# Patient Record
Sex: MALE | Race: WHITE | NOT HISPANIC OR LATINO | Employment: FULL TIME | URBAN - METROPOLITAN AREA
[De-identification: names, ages, dates, MRNs, and addresses within clinical notes are randomized per-mention and may not be internally consistent; named-entity substitution may affect disease eponyms.]

---

## 2020-12-05 ENCOUNTER — TRANSCRIBE ORDERS (OUTPATIENT)
Dept: ADMINISTRATIVE | Facility: HOSPITAL | Age: 45
End: 2020-12-05

## 2020-12-05 ENCOUNTER — LAB (OUTPATIENT)
Dept: LAB | Facility: HOSPITAL | Age: 45
End: 2020-12-05

## 2020-12-05 DIAGNOSIS — R05.9 COUGH: ICD-10-CM

## 2020-12-05 DIAGNOSIS — R05.9 COUGH: Primary | ICD-10-CM

## 2020-12-05 PROCEDURE — U0004 COV-19 TEST NON-CDC HGH THRU: HCPCS

## 2020-12-05 PROCEDURE — C9803 HOPD COVID-19 SPEC COLLECT: HCPCS

## 2020-12-06 LAB — SARS-COV-2 RNA RESP QL NAA+PROBE: NOT DETECTED

## 2021-03-10 ENCOUNTER — HOSPITAL ENCOUNTER (EMERGENCY)
Facility: HOSPITAL | Age: 46
Discharge: HOME | End: 2021-03-10
Attending: EMERGENCY MEDICINE | Admitting: EMERGENCY MEDICINE

## 2021-03-10 VITALS
WEIGHT: 165 LBS | DIASTOLIC BLOOD PRESSURE: 81 MMHG | TEMPERATURE: 97.1 F | RESPIRATION RATE: 18 BRPM | HEIGHT: 69 IN | SYSTOLIC BLOOD PRESSURE: 111 MMHG | BODY MASS INDEX: 24.44 KG/M2 | OXYGEN SATURATION: 100 % | HEART RATE: 108 BPM

## 2021-03-10 DIAGNOSIS — T50.902A INTENTIONAL DRUG OVERDOSE, INITIAL ENCOUNTER (HCC): Primary | ICD-10-CM

## 2021-03-10 DIAGNOSIS — F10.929 ALCOHOLIC INTOXICATION WITH COMPLICATION (HCC): ICD-10-CM

## 2021-03-10 LAB
AMPHET+METHAMPHET UR QL: NEGATIVE
ANION GAP SERPL CALCULATED.3IONS-SCNC: 11 MMOL/L (ref 5–15)
APAP SERPL-MCNC: <5 MCG/ML (ref 0–30)
BARBITURATES UR QL SCN: NEGATIVE
BASOPHILS # BLD AUTO: 0.1 10*3/MM3 (ref 0–0.2)
BASOPHILS NFR BLD AUTO: 1.4 % (ref 0–1.5)
BENZODIAZ UR QL SCN: NEGATIVE
BUN SERPL-MCNC: 7 MG/DL (ref 6–20)
BUN/CREAT SERPL: 9.5 (ref 7–25)
CALCIUM SPEC-SCNC: 9 MG/DL (ref 8.6–10.5)
CANNABINOIDS SERPL QL: NEGATIVE
CHLORIDE SERPL-SCNC: 107 MMOL/L (ref 98–107)
CO2 SERPL-SCNC: 24 MMOL/L (ref 22–29)
COCAINE UR QL: NEGATIVE
CREAT SERPL-MCNC: 0.74 MG/DL (ref 0.76–1.27)
DEPRECATED RDW RBC AUTO: 43.3 FL (ref 37–54)
EOSINOPHIL # BLD AUTO: 0.4 10*3/MM3 (ref 0–0.4)
EOSINOPHIL NFR BLD AUTO: 4.7 % (ref 0.3–6.2)
ERYTHROCYTE [DISTWIDTH] IN BLOOD BY AUTOMATED COUNT: 13.3 % (ref 12.3–15.4)
ETHANOL UR QL: 0.28 %
GFR SERPL CREATININE-BSD FRML MDRD: 113 ML/MIN/1.73
GLUCOSE SERPL-MCNC: 87 MG/DL (ref 65–99)
HCT VFR BLD AUTO: 48.7 % (ref 37.5–51)
HGB BLD-MCNC: 17.3 G/DL (ref 13–17.7)
HOLD SPECIMEN: NORMAL
LYMPHOCYTES # BLD AUTO: 4.2 10*3/MM3 (ref 0.7–3.1)
LYMPHOCYTES NFR BLD AUTO: 47.5 % (ref 19.6–45.3)
MCH RBC QN AUTO: 33.3 PG (ref 26.6–33)
MCHC RBC AUTO-ENTMCNC: 35.5 G/DL (ref 31.5–35.7)
MCV RBC AUTO: 93.9 FL (ref 79–97)
METHADONE UR QL SCN: NEGATIVE
MONOCYTES # BLD AUTO: 0.6 10*3/MM3 (ref 0.1–0.9)
MONOCYTES NFR BLD AUTO: 6.6 % (ref 5–12)
NEUTROPHILS NFR BLD AUTO: 3.5 10*3/MM3 (ref 1.7–7)
NEUTROPHILS NFR BLD AUTO: 39.8 % (ref 42.7–76)
NRBC BLD AUTO-RTO: 0.3 /100 WBC (ref 0–0.2)
OPIATES UR QL: NEGATIVE
OXYCODONE UR QL SCN: NEGATIVE
PLATELET # BLD AUTO: 277 10*3/MM3 (ref 140–450)
PMV BLD AUTO: 9 FL (ref 6–12)
POTASSIUM SERPL-SCNC: 4 MMOL/L (ref 3.5–5.2)
RBC # BLD AUTO: 5.19 10*6/MM3 (ref 4.14–5.8)
SALICYLATES SERPL-MCNC: <0.3 MG/DL
SODIUM SERPL-SCNC: 142 MMOL/L (ref 136–145)
WBC # BLD AUTO: 8.8 10*3/MM3 (ref 3.4–10.8)
WHOLE BLOOD HOLD SPECIMEN: NORMAL

## 2021-03-10 PROCEDURE — 80307 DRUG TEST PRSMV CHEM ANLYZR: CPT | Performed by: EMERGENCY MEDICINE

## 2021-03-10 PROCEDURE — 93005 ELECTROCARDIOGRAM TRACING: CPT | Performed by: EMERGENCY MEDICINE

## 2021-03-10 PROCEDURE — 85025 COMPLETE CBC W/AUTO DIFF WBC: CPT | Performed by: EMERGENCY MEDICINE

## 2021-03-10 PROCEDURE — 82077 ASSAY SPEC XCP UR&BREATH IA: CPT | Performed by: EMERGENCY MEDICINE

## 2021-03-10 PROCEDURE — 80143 DRUG ASSAY ACETAMINOPHEN: CPT | Performed by: EMERGENCY MEDICINE

## 2021-03-10 PROCEDURE — 80048 BASIC METABOLIC PNL TOTAL CA: CPT | Performed by: EMERGENCY MEDICINE

## 2021-03-10 PROCEDURE — 80179 DRUG ASSAY SALICYLATE: CPT | Performed by: EMERGENCY MEDICINE

## 2021-03-10 PROCEDURE — 99284 EMERGENCY DEPT VISIT MOD MDM: CPT

## 2021-03-10 RX ORDER — SODIUM CHLORIDE 0.9 % (FLUSH) 0.9 %
10 SYRINGE (ML) INJECTION AS NEEDED
Status: DISCONTINUED | OUTPATIENT
Start: 2021-03-10 | End: 2021-03-10 | Stop reason: HOSPADM

## 2021-03-10 NOTE — ED NOTES
Pt can be heard yelling at his wife over the phone that he feels like we are not paying attention to him, we have stuck him in a corner and locked him away so he wants to leave. Security called and one on one observation is initiated. MD mendez.      Diana Burk, RN  03/10/21 0559

## 2021-03-10 NOTE — DISCHARGE INSTRUCTIONS
Follow-up with Yamel as directed.    Follow-up with your primary care provider in 3-5 days.  If you do not have a primary care provider call [phone] for help in finding one, or you may follow up with [facility] at [phone].    Return to ED for any new or worsening symptoms

## 2021-03-10 NOTE — ED NOTES
Spoke to Yamel they are recommending IOP, they are sending a fax over for his recommendation and patient will be discharged.     Megan Barahona RN  03/10/21 6302

## 2021-03-10 NOTE — ED NOTES
Pt has moved all monitoring devices and refuses to have put back on.     Jessie De Los Santos, RN  03/10/21 0759

## 2021-03-10 NOTE — ED NOTES
Spoke to monik, evaluator stated she is waiting on their provider to call back and decide if they will admit him.     Megan Barahona RN  03/10/21 7457

## 2021-03-10 NOTE — ED PROVIDER NOTES
Subjective   History of Present Illness    Context: 45-year-old male presents after taking an overdose of Azilect tonight.  This was approximately 2 hours prior to arrival.  He has had no vomiting.  He states that he did something stupid.  He states he was not really trying to kill himself now.  He had been drinking and apparently had been in an argument.  He reports he has never done this previously.  Location: Ingestion  Quality:  Duration: 2 hours prior to arrival  Timing: Once  Severity:   Modifying factors: Positive alcohol use  Associated signs and symptoms:    Review of Systems   Constitutional: Negative for fever.   HENT: Negative for congestion and sore throat.    Eyes: Negative for pain and visual disturbance.   Respiratory: Negative for cough and shortness of breath.    Cardiovascular: Negative for chest pain and leg swelling.   Gastrointestinal: Negative for abdominal pain, diarrhea and vomiting.   Genitourinary: Negative for dysuria.   Musculoskeletal: Negative for back pain.   Skin: Negative for rash.   Neurological: Negative for dizziness, weakness and headaches.        Parkinson's disease   Psychiatric/Behavioral: Positive for dysphoric mood. Negative for confusion.       No past medical history on file.  Parkinson disease  No Known Allergies    No past surgical history on file.    No family history on file.  Social history positive tobacco and alcohol use  Social History     Socioeconomic History   • Marital status:      Spouse name: Not on file   • Number of children: Not on file   • Years of education: Not on file   • Highest education level: Not on file     Reports his only medication is Azilect      Objective   Physical Exam  45-year-old male awake alert.  Generally well-developed well-nourished.  Pupils equal round to light.  Neck supple chest clear cardiovascular regular rate and rhythm.  Abdomen soft nontender.  Extremities no tenderness edema.  Neurologic exam he is noted to have  slurred speech consistent with alcohol ingestion.  No other focal findings are noted this time.  Psychiatric evaluation he is not forthcoming with events although later did say he does not want to kill himself  Procedures           ED Course  ED Course as of Mar 10 0643   Wed Mar 10, 2021   0635 Patient care transferred to me from Dr. Le pending [facility] consultation and disposition    [AA]   0642 Sleeping vital signs stable    [SP]      ED Course User Index  [AA] Shruthi Barrios PA  [SP] Dino Le MD                                           [facility]  Chart review: Only outpatient visits  Comorbidity: As per past history  Differential: Overdose, alcohol desiccation, suicidal ideation  My EKG interpretation: Sinus rhythm without acute abnormality  Lab: Acetaminophen and salicylate both negative ethanol 0.278, CBC and basic metabolic panel without significant abnormality urine DOA negative  Radiology:   Discussion/treatment: Poison control was contacted.  They recommend 6-hour observation.  Major risk would be serotonin syndrome.  Patient has remained asymptomatic.  He will have [facility] evaluation.  He will be turned over to THOMAS Barron for final evaluation and disposition  Patient was evaluated using appropriate PPE      Final diagnoses:   Intentional drug overdose, initial encounter (CMS/[facility])   Alcoholic intoxication with complication (CMS/[facility])            Dino Le MD  03/10/21 0630

## 2021-03-10 NOTE — ED NOTES
Spoke with joe Pritchett at Poison control center. States there is a 6 hour monitoring period. Recommends EKG, CMP, CBC, salicylate level, tylenol level and ETOH. States there is a high risk for developing serotonin syndrome. If the pt develops any of those symptoms recommends ativan or versed for management. Diana Alegria, RN  03/10/21 0431

## 2021-03-10 NOTE — ED NOTES
Santos from poison control called for update. He states that he will call back for [facility] drug screen results.     Jessica Perez LPN  03/10/21 0617

## 2021-03-10 NOTE — ED NOTES
Pt reports he took 10 tabs of his parkinson's medication tonight. He is prescribed this but is only suppose to take 1 tab per day. He is unsure of the dose. States he has been having a lot of issues at work recently but is unsure why he took these medications tonight. He told EMS that he was attempting suicide but denies this to me upon assessment. Pt also admits to ETOH use tonight. Pt is placed in suicide precautions at this time. He is on the full cardiac monitor per poison control but is in a glass door room directly into the nurses station.      Diana Burk, RN  03/10/21 0512       Diana Burk, DEEPAK  03/10/21 0514

## 2021-03-10 NOTE — ED NOTES
0755- call placed to [facility], spoke to Kaitlynn. She stated that she was going to call and evaluate the pt in about 5 minutes. She just received the BAT that was faxed.      Sho Crespo  03/10/21 0758

## 2021-03-11 LAB — QT INTERVAL: 353 MS

## 2023-04-24 ENCOUNTER — APPOINTMENT (OUTPATIENT)
Dept: GENERAL RADIOLOGY | Facility: HOSPITAL | Age: 48
End: 2023-04-24
Payer: COMMERCIAL

## 2023-04-24 ENCOUNTER — HOSPITAL ENCOUNTER (EMERGENCY)
Facility: HOSPITAL | Age: 48
Discharge: HOME | End: 2023-04-24
Attending: EMERGENCY MEDICINE | Admitting: EMERGENCY MEDICINE
Payer: COMMERCIAL

## 2023-04-24 VITALS
TEMPERATURE: 98.6 F | OXYGEN SATURATION: 99 % | SYSTOLIC BLOOD PRESSURE: 140 MMHG | DIASTOLIC BLOOD PRESSURE: 82 MMHG | RESPIRATION RATE: 19 BRPM | BODY MASS INDEX: 24.44 KG/M2 | HEART RATE: 90 BPM | HEIGHT: 69 IN | WEIGHT: 165 LBS

## 2023-04-24 DIAGNOSIS — L03.115 CELLULITIS OF RIGHT LOWER EXTREMITY: ICD-10-CM

## 2023-04-24 DIAGNOSIS — A28.1 CAT SCRATCH FEVER: Primary | ICD-10-CM

## 2023-04-24 LAB
ALBUMIN SERPL-MCNC: 4.1 G/DL (ref 3.5–5.2)
ALBUMIN/GLOB SERPL: 1.2 G/DL
ALP SERPL-CCNC: 71 U/L (ref 39–117)
ALT SERPL W P-5'-P-CCNC: 5 U/L (ref 1–41)
ANION GAP SERPL CALCULATED.3IONS-SCNC: 12 MMOL/L (ref 5–15)
AST SERPL-CCNC: 18 U/L (ref 1–40)
BASOPHILS # BLD AUTO: 0.1 10*3/MM3 (ref 0–0.2)
BASOPHILS NFR BLD AUTO: 0.7 % (ref 0–1.5)
BILIRUB SERPL-MCNC: 0.7 MG/DL (ref 0–1.2)
BUN SERPL-MCNC: 8 MG/DL (ref 6–20)
BUN/CREAT SERPL: 16.7 (ref 7–25)
CALCIUM SPEC-SCNC: 9.3 MG/DL (ref 8.6–10.5)
CHLORIDE SERPL-SCNC: 101 MMOL/L (ref 98–107)
CO2 SERPL-SCNC: 24 MMOL/L (ref 22–29)
CREAT SERPL-MCNC: 0.48 MG/DL (ref 0.76–1.27)
D-LACTATE SERPL-SCNC: 0.8 MMOL/L (ref 0.5–2)
DEPRECATED RDW RBC AUTO: 45.9 FL (ref 37–54)
EGFRCR SERPLBLD CKD-EPI 2021: 126.6 ML/MIN/1.73
EOSINOPHIL # BLD AUTO: 0.1 10*3/MM3 (ref 0–0.4)
EOSINOPHIL NFR BLD AUTO: 1.6 % (ref 0.3–6.2)
ERYTHROCYTE [DISTWIDTH] IN BLOOD BY AUTOMATED COUNT: 13.4 % (ref 12.3–15.4)
GLOBULIN UR ELPH-MCNC: 3.3 GM/DL
GLUCOSE SERPL-MCNC: 121 MG/DL (ref 65–99)
HCT VFR BLD AUTO: 43.5 % (ref 37.5–51)
HGB BLD-MCNC: 14.8 G/DL (ref 13–17.7)
LYMPHOCYTES # BLD AUTO: 2 10*3/MM3 (ref 0.7–3.1)
LYMPHOCYTES NFR BLD AUTO: 23.3 % (ref 19.6–45.3)
MCH RBC QN AUTO: 32 PG (ref 26.6–33)
MCHC RBC AUTO-ENTMCNC: 34.1 G/DL (ref 31.5–35.7)
MCV RBC AUTO: 93.9 FL (ref 79–97)
MONOCYTES # BLD AUTO: 0.6 10*3/MM3 (ref 0.1–0.9)
MONOCYTES NFR BLD AUTO: 7.4 % (ref 5–12)
NEUTROPHILS NFR BLD AUTO: 5.8 10*3/MM3 (ref 1.7–7)
NEUTROPHILS NFR BLD AUTO: 67 % (ref 42.7–76)
NRBC BLD AUTO-RTO: 0.1 /100 WBC (ref 0–0.2)
PLATELET # BLD AUTO: 264 10*3/MM3 (ref 140–450)
PMV BLD AUTO: 8.6 FL (ref 6–12)
POTASSIUM SERPL-SCNC: 3.8 MMOL/L (ref 3.5–5.2)
PROT SERPL-MCNC: 7.4 G/DL (ref 6–8.5)
RBC # BLD AUTO: 4.63 10*6/MM3 (ref 4.14–5.8)
SODIUM SERPL-SCNC: 137 MMOL/L (ref 136–145)
WBC NRBC COR # BLD: 8.7 10*3/MM3 (ref 3.4–10.8)

## 2023-04-24 PROCEDURE — 36415 COLL VENOUS BLD VENIPUNCTURE: CPT

## 2023-04-24 PROCEDURE — 80053 COMPREHEN METABOLIC PANEL: CPT | Performed by: NURSE PRACTITIONER

## 2023-04-24 PROCEDURE — 87070 CULTURE OTHR SPECIMN AEROBIC: CPT | Performed by: NURSE PRACTITIONER

## 2023-04-24 PROCEDURE — 99283 EMERGENCY DEPT VISIT LOW MDM: CPT

## 2023-04-24 PROCEDURE — 25010000002 AZITHROMYCIN PER 500 MG: Performed by: NURSE PRACTITIONER

## 2023-04-24 PROCEDURE — 87040 BLOOD CULTURE FOR BACTERIA: CPT | Performed by: NURSE PRACTITIONER

## 2023-04-24 PROCEDURE — 96365 THER/PROPH/DIAG IV INF INIT: CPT

## 2023-04-24 PROCEDURE — 73610 X-RAY EXAM OF ANKLE: CPT

## 2023-04-24 PROCEDURE — 85025 COMPLETE CBC W/AUTO DIFF WBC: CPT | Performed by: NURSE PRACTITIONER

## 2023-04-24 PROCEDURE — 83605 ASSAY OF LACTIC ACID: CPT | Performed by: NURSE PRACTITIONER

## 2023-04-24 PROCEDURE — 87205 SMEAR GRAM STAIN: CPT | Performed by: NURSE PRACTITIONER

## 2023-04-24 RX ORDER — AZITHROMYCIN 250 MG/1
TABLET, FILM COATED ORAL
Qty: 4 TABLET | Refills: 0 | Status: SHIPPED | OUTPATIENT
Start: 2023-04-24

## 2023-04-24 RX ORDER — SODIUM CHLORIDE 0.9 % (FLUSH) 0.9 %
10 SYRINGE (ML) INJECTION AS NEEDED
Status: DISCONTINUED | OUTPATIENT
Start: 2023-04-24 | End: 2023-04-24 | Stop reason: HOSPADM

## 2023-04-24 RX ADMIN — AZITHROMYCIN 500 MG: 500 INJECTION, POWDER, LYOPHILIZED, FOR SOLUTION INTRAVENOUS at 17:02

## 2023-04-24 NOTE — ED PROVIDER NOTES
Subjective   History of Present Illness  47-year-old male presents with complaint of cat bite on Friday.  He reports he went to the urgent care and was started on Augmentin.  He reports that began becoming erythematous with drainage yesterday and pain and swelling began in his right lateral malleolus.  He denies known fever.  He does report not feeling well.    Primary care: Sydnee Rodriges NP        Review of Systems    Past Medical History:   Diagnosis Date   • Parkinsons        No Known Allergies    No past surgical history on file.    No family history on file.    Social History     Socioeconomic History   • Marital status:    Tobacco Use   • Smoking status: Every Day     Types: Cigarettes     Passive exposure: Current   • Smokeless tobacco: Never   Vaping Use   • Vaping Use: Never used   Substance and Sexual Activity   • Alcohol use: Yes     Comment: OCC           Objective   Physical Exam  Vitals and nursing note reviewed.   Constitutional:       General: He is not in acute distress.     Appearance: He is well-developed.   HENT:      Head: Normocephalic and atraumatic.   Cardiovascular:      Rate and Rhythm: Normal rate and regular rhythm.      Heart sounds: Normal heart sounds. No murmur heard.    No friction rub. No gallop.   Pulmonary:      Effort: Pulmonary effort is normal. No respiratory distress.      Breath sounds: Normal breath sounds. No wheezing.   Musculoskeletal:      Cervical back: Normal range of motion and neck supple.   Lymphadenopathy:      Cervical: No cervical adenopathy.   Skin:     General: Skin is warm and dry.      Capillary Refill: Capillary refill takes less than 2 seconds.      Findings: No rash.   Neurological:      Mental Status: He is alert and oriented to person, place, and time.   Psychiatric:         Behavior: Behavior normal.         Thought Content: Thought content normal.         Judgment: Judgment normal.         Procedures           ED Course                           "                 Medical Decision Making  Amount and/or Complexity of Data Reviewed  Labs: ordered. Decision-making details documented in ED Course.  Radiology: ordered. Decision-making details documented in ED Course.      Risk  Prescription drug management.        Interpreted by radiologist as below:     XR Ankle 3+ View Right    Result Date: 4/24/2023  Impression: Diffuse soft tissue swelling without acute osseous abnormality noted Electronically Signed: Sesar Kim  4/24/2023 4:45 PM EDT  Workstation ID: [id number]        /84 (BP Location: Left arm, Patient Position: Sitting)   Pulse 94   Temp 98.5 °F (36.9 °C) (Oral)   Resp 20   Ht 175.3 cm (69\")   Wt 74.8 kg (165 lb)   SpO2 99%   BMI 24.37 kg/m²      Lab Results (last 24 hours)     Procedure Component Value Units Date/Time    CBC & Differential [[id number]]  (Normal) Collected: 04/24/23 1618    Specimen: Blood from Arm, Left Updated: 04/24/23 1626    Narrative:      The following orders were created for panel order CBC & Differential.  Procedure                               Abnormality         Status                     ---------                               -----------         ------                     CBC Auto Differential[[id number]]        Normal              Final result                 Please view results for these tests on the individual orders.    Comprehensive Metabolic Panel [[id number]]  (Abnormal) Collected: 04/24/23 1618    Specimen: Blood from Arm, Left Updated: 04/24/23 1649     Glucose 121 mg/dL      BUN 8 mg/dL      Creatinine 0.48 mg/dL      Sodium 137 mmol/L      Potassium 3.8 mmol/L      Chloride 101 mmol/L      CO2 24.0 mmol/L      Calcium 9.3 mg/dL      Total Protein 7.4 g/dL      Albumin 4.1 g/dL      ALT (SGPT) 5 U/L      AST (SGOT) 18 U/L      Alkaline Phosphatase 71 U/L      Total Bilirubin 0.7 mg/dL      Globulin 3.3 gm/dL      A/G Ratio 1.2 g/dL      BUN/Creatinine Ratio 16.7     Anion Gap 12.0 mmol/L      eGFR 126.6 " mL/min/1.73     Narrative:      GFR Normal >60  Chronic Kidney Disease <60  Kidney Failure <15      Lactic Acid, Plasma [[id number]]  (Normal) Collected: 04/24/23 1618    Specimen: Blood from Arm, Left Updated: 04/24/23 1650     Lactate 0.8 mmol/L     Blood Culture - Blood, Arm, Left [[id number]] Collected: 04/24/23 1618    Specimen: Blood from Arm, Left Updated: 04/24/23 1623    CBC Auto Differential [[id number]]  (Normal) Collected: 04/24/23 1618    Specimen: Blood from Arm, Left Updated: 04/24/23 1626     WBC 8.70 10*3/mm3      RBC 4.63 10*6/mm3      Hemoglobin 14.8 g/dL      Hematocrit 43.5 %      MCV 93.9 fL      MCH 32.0 pg      MCHC 34.1 g/dL      RDW 13.4 %      RDW-SD 45.9 fl      MPV 8.6 fL      Platelets 264 10*3/mm3      Neutrophil % 67.0 %      Lymphocyte % 23.3 %      Monocyte % 7.4 %      Eosinophil % 1.6 %      Basophil % 0.7 %      Neutrophils, Absolute 5.80 10*3/mm3      Lymphocytes, Absolute 2.00 10*3/mm3      Monocytes, Absolute 0.60 10*3/mm3      Eosinophils, Absolute 0.10 10*3/mm3      Basophils, Absolute 0.10 10*3/mm3      nRBC 0.1 /100 WBC     Wound Culture - Swab, Leg, Right [[id number]] Collected: 04/24/23 1619    Specimen: Swab from Leg, Right Updated: 04/24/23 1623    Blood Culture - Blood, Arm, Left [[id number]] Collected: 04/24/23 1632    Specimen: Blood from Arm, Left Updated: 04/24/23 1648           Medications   sodium chloride 0.9 % flush 10 mL (has no administration in time range)   azithromycin (ZITHROMAX) 500 mg in sodium chloride 0.9 % 250 mL IVPB-VTB (500 mg Intravenous New Bag 4/24/23 1702)        Patient undressed and placed in gown for exam.  Appropriate PPE worn during patient exam.  Appropriate monitoring initiated. Patient is alert and oriented x3.  No acute distress noted.  Wound was painted with Betadine.  IV established and labs obtained.  X-ray of the right ankle obtained with the above findings.  CBC CMP essentially unremarkable.  Lactic 0.8 blood cultures pending.  Wound  culture pending.  X-ray was significant for soft tissue swelling, otherwise no acute osseous abnormalities patient was given azithromycin 500 mg IV.  He was discharged home on azithromycin 250 once daily for 4 days.  He is encouraged to cleanse twice daily with antibacterial soap and water and apply bacitracin.    I reviewed chart 4/21/2023 patient was seen at urgent care for cat bite of the right ankle.. My radiology interpretation soft tissue swelling of the right ankle without gas.  No obvious tendinopathy.  Differential Diagnoses, not all-inclusive and does not constitute entirety of all causes: Cat scratch fever, cellulitis.  Patient was found to have both on exam.    Disposition/Treatment: Discussed results with patient, verbalized understanding. Discussed reasons to return to the ER, patient verbalized understanding. Agreeable with plan of care. Patient was stable upon discharge.    Upon reassessment, patient is flesh tone warm and dry no acute distress noted.  Vital signs are stable.    Part of this note may be an electronic transcription/translation of spoken language to printed text using the Dragon Dictation System.         Final diagnoses:   Cat scratch fever   Cellulitis of right lower extremity       ED Disposition  ED Disposition     ED Disposition   Discharge    Condition   Stable    Comment   --             Sydnee Rodriges, APRN  [address] [facility] [address] [facility] [place] [address] [phone]    Schedule an appointment as soon as possible for a visit       [facility] EMERGENCY DEPARTMENT  [address] [address] [phone]  Go to   If symptoms worsen         Medication List      New Prescriptions    azithromycin 250 MG tablet  Commonly known as: Zithromax Z-Jean  Take1 tablet daily for 4 days.           Where to Get Your Medications      These medications were sent to [id number] - [facility] [place] - [address] - [phone]  - [phone]   [address], [place] [address]    Phone: [phone]   · azithromycin 250 MG tablet          Madison Matthews, APRN  04/24/23 9330

## 2023-04-24 NOTE — DISCHARGE INSTRUCTIONS
Antibiotics as prescribed.  Make sure you are cleansing the area twice daily with antibacterial soap and water then apply bacitracin bandage.  Schedule follow-up for wound check in the next 2 days.  Return to the ER for new or worsening symptoms.

## 2023-04-27 LAB
BACTERIA SPEC AEROBE CULT: ABNORMAL
GRAM STN SPEC: ABNORMAL

## 2023-04-27 NOTE — PROGRESS NOTES
Patient was seen in ED on 4/24 for cat bite. Patient was previously given amoxicillin with no resolution in symptoms. Patient wound culture resulted with Pasteurella multocida. Patient was given Rx for azithromycin. Therapy is appropriate coverage. No further follow-up required..    Microbiology Results (last 10 days)       Procedure Component Value - Date/Time    Blood Culture - Blood, Arm, Left [[id number]]  (Normal) Collected: 04/24/23 1632    Lab Status: Preliminary result Specimen: Blood from Arm, Left Updated: 04/26/23 1700     Blood Culture No growth at 2 days    Wound Culture - Swab, Leg, Right [[id number]]  (Abnormal) Collected: 04/24/23 1619    Lab Status: Final result Specimen: Swab from Leg, Right Updated: 04/27/23 0828     Wound Culture Scant growth (1+) Pasteurella multocida     Gram Stain No organisms seen    Narrative:      Pasteurella species are susceptible to penicillin, cephalosporins (except oral agents), tetracycline, quinolones, trimethoprim-sulfamethoxazole.      Blood Culture - Blood, Arm, Left [[id number]]  (Normal) Collected: 04/24/23 1618    Lab Status: Preliminary result Specimen: Blood from Arm, Left Updated: 04/26/23 1631     Blood Culture No growth at 2 days            Vera Ambriz, PharmLAURIE  4/27/2023 10:22 EDT

## 2023-04-29 LAB
BACTERIA SPEC AEROBE CULT: NORMAL
BACTERIA SPEC AEROBE CULT: NORMAL

## 2024-06-21 ENCOUNTER — OFFICE (AMBULATORY)
Dept: URBAN - METROPOLITAN AREA PATHOLOGY 19 | Facility: PATHOLOGY | Age: 49
End: 2024-06-21
Payer: COMMERCIAL

## 2024-06-21 ENCOUNTER — ON CAMPUS - OUTPATIENT (AMBULATORY)
Dept: URBAN - METROPOLITAN AREA HOSPITAL 2 | Facility: HOSPITAL | Age: 49
End: 2024-06-21
Payer: COMMERCIAL

## 2024-06-21 VITALS
HEART RATE: 76 BPM | SYSTOLIC BLOOD PRESSURE: 99 MMHG | HEART RATE: 82 BPM | SYSTOLIC BLOOD PRESSURE: 98 MMHG | OXYGEN SATURATION: 98 % | DIASTOLIC BLOOD PRESSURE: 99 MMHG | HEIGHT: 69 IN | RESPIRATION RATE: 20 BRPM | SYSTOLIC BLOOD PRESSURE: 136 MMHG | DIASTOLIC BLOOD PRESSURE: 74 MMHG | DIASTOLIC BLOOD PRESSURE: 73 MMHG | WEIGHT: 160.2 LBS | OXYGEN SATURATION: 100 % | HEART RATE: 74 BPM | DIASTOLIC BLOOD PRESSURE: 69 MMHG | RESPIRATION RATE: 13 BRPM | DIASTOLIC BLOOD PRESSURE: 82 MMHG | SYSTOLIC BLOOD PRESSURE: 112 MMHG | HEART RATE: 79 BPM | SYSTOLIC BLOOD PRESSURE: 107 MMHG | OXYGEN SATURATION: 97 % | DIASTOLIC BLOOD PRESSURE: 70 MMHG | HEART RATE: 81 BPM | HEART RATE: 72 BPM | OXYGEN SATURATION: 99 % | SYSTOLIC BLOOD PRESSURE: 115 MMHG | SYSTOLIC BLOOD PRESSURE: 116 MMHG | DIASTOLIC BLOOD PRESSURE: 80 MMHG | HEART RATE: 80 BPM | DIASTOLIC BLOOD PRESSURE: 95 MMHG | RESPIRATION RATE: 18 BRPM | RESPIRATION RATE: 15 BRPM | RESPIRATION RATE: 16 BRPM | SYSTOLIC BLOOD PRESSURE: 119 MMHG | TEMPERATURE: 97.3 F | RESPIRATION RATE: 14 BRPM

## 2024-06-21 DIAGNOSIS — K57.30 DIVERTICULOSIS OF LARGE INTESTINE WITHOUT PERFORATION OR ABS: ICD-10-CM

## 2024-06-21 DIAGNOSIS — D12.3 BENIGN NEOPLASM OF TRANSVERSE COLON: ICD-10-CM

## 2024-06-21 DIAGNOSIS — Z12.11 ENCOUNTER FOR SCREENING FOR MALIGNANT NEOPLASM OF COLON: ICD-10-CM

## 2024-06-21 DIAGNOSIS — D12.8 BENIGN NEOPLASM OF RECTUM: ICD-10-CM

## 2024-06-21 DIAGNOSIS — D12.5 BENIGN NEOPLASM OF SIGMOID COLON: ICD-10-CM

## 2024-06-21 DIAGNOSIS — K64.1 SECOND DEGREE HEMORRHOIDS: ICD-10-CM

## 2024-06-21 PROBLEM — K63.5 POLYP OF COLON: Status: ACTIVE | Noted: 2024-06-21

## 2024-06-21 PROBLEM — K62.1 RECTAL POLYP: Status: ACTIVE | Noted: 2024-06-21

## 2024-06-21 LAB
GI HISTOLOGY: A. TRANSVERSE COLON: (no result)
GI HISTOLOGY: B. SIGMOID COLON: (no result)
GI HISTOLOGY: C. RECTUM: (no result)
GI HISTOLOGY: PDF REPORT: (no result)

## 2024-06-21 PROCEDURE — 45385 COLONOSCOPY W/LESION REMOVAL: CPT | Mod: 33 | Performed by: INTERNAL MEDICINE

## 2024-06-21 PROCEDURE — 88305 TISSUE EXAM BY PATHOLOGIST: CPT | Performed by: PATHOLOGY

## 2024-07-11 ENCOUNTER — LAB (OUTPATIENT)
Dept: LAB | Facility: HOSPITAL | Age: 49
End: 2024-07-11
Payer: COMMERCIAL

## 2024-07-11 ENCOUNTER — HOSPITAL ENCOUNTER (OUTPATIENT)
Dept: CARDIOLOGY | Facility: HOSPITAL | Age: 49
Discharge: HOME | End: 2024-07-11
Payer: COMMERCIAL

## 2024-07-11 ENCOUNTER — TRANSCRIBE ORDERS (OUTPATIENT)
Dept: ADMINISTRATIVE | Facility: HOSPITAL | Age: 49
End: 2024-07-11
Payer: COMMERCIAL

## 2024-07-11 ENCOUNTER — HOSPITAL ENCOUNTER (OUTPATIENT)
Dept: GENERAL RADIOLOGY | Facility: HOSPITAL | Age: 49
Discharge: HOME | End: 2024-07-11
Payer: COMMERCIAL

## 2024-07-11 DIAGNOSIS — Z01.818 PRE-OP TESTING: ICD-10-CM

## 2024-07-11 DIAGNOSIS — Z01.818 PRE-OP TESTING: Primary | ICD-10-CM

## 2024-07-11 LAB
ALBUMIN SERPL-MCNC: 4.1 G/DL (ref 3.5–5.2)
ALBUMIN/GLOB SERPL: 1.4 G/DL
ALP SERPL-CCNC: 67 U/L (ref 39–117)
ALT SERPL W P-5'-P-CCNC: 6 U/L (ref 1–41)
ANION GAP SERPL CALCULATED.3IONS-SCNC: 10.3 MMOL/L (ref 5–15)
AST SERPL-CCNC: 23 U/L (ref 1–40)
BASOPHILS # BLD AUTO: 0.08 10*3/MM3 (ref 0–0.2)
BASOPHILS NFR BLD AUTO: 1.2 % (ref 0–1.5)
BILIRUB SERPL-MCNC: 0.4 MG/DL (ref 0–1.2)
BUN SERPL-MCNC: 8 MG/DL (ref 6–20)
BUN/CREAT SERPL: 9.8 (ref 7–25)
CALCIUM SPEC-SCNC: 9.3 MG/DL (ref 8.6–10.5)
CHLORIDE SERPL-SCNC: 106 MMOL/L (ref 98–107)
CO2 SERPL-SCNC: 23.7 MMOL/L (ref 22–29)
CREAT SERPL-MCNC: 0.82 MG/DL (ref 0.76–1.27)
DEPRECATED RDW RBC AUTO: 41.2 FL (ref 37–54)
EGFRCR SERPLBLD CKD-EPI 2021: 106.4 ML/MIN/1.73
EOSINOPHIL # BLD AUTO: 0.19 10*3/MM3 (ref 0–0.4)
EOSINOPHIL NFR BLD AUTO: 2.9 % (ref 0.3–6.2)
ERYTHROCYTE [DISTWIDTH] IN BLOOD BY AUTOMATED COUNT: 11.9 % (ref 12.3–15.4)
GLOBULIN UR ELPH-MCNC: 2.9 GM/DL
GLUCOSE SERPL-MCNC: 107 MG/DL (ref 65–99)
HCT VFR BLD AUTO: 45 % (ref 37.5–51)
HGB BLD-MCNC: 15.4 G/DL (ref 13–17.7)
IMM GRANULOCYTES # BLD AUTO: 0.01 10*3/MM3 (ref 0–0.05)
IMM GRANULOCYTES NFR BLD AUTO: 0.2 % (ref 0–0.5)
LYMPHOCYTES # BLD AUTO: 2.38 10*3/MM3 (ref 0.7–3.1)
LYMPHOCYTES NFR BLD AUTO: 36.6 % (ref 19.6–45.3)
MCH RBC QN AUTO: 32.6 PG (ref 26.6–33)
MCHC RBC AUTO-ENTMCNC: 34.2 G/DL (ref 31.5–35.7)
MCV RBC AUTO: 95.3 FL (ref 79–97)
MONOCYTES # BLD AUTO: 0.59 10*3/MM3 (ref 0.1–0.9)
MONOCYTES NFR BLD AUTO: 9.1 % (ref 5–12)
NEUTROPHILS NFR BLD AUTO: 3.25 10*3/MM3 (ref 1.7–7)
NEUTROPHILS NFR BLD AUTO: 50 % (ref 42.7–76)
NRBC BLD AUTO-RTO: 0 /100 WBC (ref 0–0.2)
PLATELET # BLD AUTO: 283 10*3/MM3 (ref 140–450)
PMV BLD AUTO: 11.1 FL (ref 6–12)
POTASSIUM SERPL-SCNC: 4.7 MMOL/L (ref 3.5–5.2)
PROT SERPL-MCNC: 7 G/DL (ref 6–8.5)
QT INTERVAL: 371 MS
QTC INTERVAL: 424 MS
RBC # BLD AUTO: 4.72 10*6/MM3 (ref 4.14–5.8)
SODIUM SERPL-SCNC: 140 MMOL/L (ref 136–145)
WBC NRBC COR # BLD AUTO: 6.5 10*3/MM3 (ref 3.4–10.8)

## 2024-07-11 PROCEDURE — 80053 COMPREHEN METABOLIC PANEL: CPT

## 2024-07-11 PROCEDURE — 36415 COLL VENOUS BLD VENIPUNCTURE: CPT

## 2024-07-11 PROCEDURE — 85025 COMPLETE CBC W/AUTO DIFF WBC: CPT

## 2024-07-11 PROCEDURE — 71046 X-RAY EXAM CHEST 2 VIEWS: CPT

## 2024-07-11 PROCEDURE — 93005 ELECTROCARDIOGRAM TRACING: CPT | Performed by: STUDENT IN AN ORGANIZED HEALTH CARE EDUCATION/TRAINING PROGRAM

## 2024-07-18 LAB
QT INTERVAL: 371 MS
QTC INTERVAL: 424 MS

## 2024-09-18 ENCOUNTER — TRANSCRIBE ORDERS (OUTPATIENT)
Dept: ADMINISTRATIVE | Facility: HOSPITAL | Age: 49
End: 2024-09-18
Payer: COMMERCIAL

## 2024-09-18 ENCOUNTER — HOSPITAL ENCOUNTER (OUTPATIENT)
Dept: CARDIOLOGY | Facility: HOSPITAL | Age: 49
Discharge: HOME | End: 2024-09-18
Admitting: NURSE PRACTITIONER
Payer: COMMERCIAL

## 2024-09-18 DIAGNOSIS — R22.41 LOCALIZED SWELLING, MASS, OR LUMP OF LOWER EXTREMITY, RIGHT: Primary | ICD-10-CM

## 2024-09-18 DIAGNOSIS — R22.41 LOCALIZED SWELLING, MASS, OR LUMP OF LOWER EXTREMITY, RIGHT: ICD-10-CM

## 2024-09-18 LAB
BH CV LOWER VASCULAR LEFT COMMON FEMORAL AUGMENT: NORMAL
BH CV LOWER VASCULAR LEFT COMMON FEMORAL COMPETENT: NORMAL
BH CV LOWER VASCULAR LEFT COMMON FEMORAL COMPRESS: NORMAL
BH CV LOWER VASCULAR LEFT COMMON FEMORAL PHASIC: NORMAL
BH CV LOWER VASCULAR LEFT COMMON FEMORAL SPONT: NORMAL
BH CV LOWER VASCULAR RIGHT COMMON FEMORAL AUGMENT: NORMAL
BH CV LOWER VASCULAR RIGHT COMMON FEMORAL COMPETENT: NORMAL
BH CV LOWER VASCULAR RIGHT COMMON FEMORAL COMPRESS: NORMAL
BH CV LOWER VASCULAR RIGHT COMMON FEMORAL PHASIC: NORMAL
BH CV LOWER VASCULAR RIGHT COMMON FEMORAL SPONT: NORMAL
BH CV LOWER VASCULAR RIGHT DISTAL FEMORAL COMPRESS: NORMAL
BH CV LOWER VASCULAR RIGHT GASTRONEMIUS COMPRESS: NORMAL
BH CV LOWER VASCULAR RIGHT GREATER SAPH AK COMPRESS: NORMAL
BH CV LOWER VASCULAR RIGHT GREATER SAPH BK COMPRESS: NORMAL
BH CV LOWER VASCULAR RIGHT LESSER SAPH COMPRESS: NORMAL
BH CV LOWER VASCULAR RIGHT MID FEMORAL AUGMENT: NORMAL
BH CV LOWER VASCULAR RIGHT MID FEMORAL COMPETENT: NORMAL
BH CV LOWER VASCULAR RIGHT MID FEMORAL COMPRESS: NORMAL
BH CV LOWER VASCULAR RIGHT MID FEMORAL PHASIC: NORMAL
BH CV LOWER VASCULAR RIGHT MID FEMORAL SPONT: NORMAL
BH CV LOWER VASCULAR RIGHT PERONEAL COMPRESS: NORMAL
BH CV LOWER VASCULAR RIGHT POPLITEAL AUGMENT: NORMAL
BH CV LOWER VASCULAR RIGHT POPLITEAL COMPETENT: NORMAL
BH CV LOWER VASCULAR RIGHT POPLITEAL COMPRESS: NORMAL
BH CV LOWER VASCULAR RIGHT POPLITEAL PHASIC: NORMAL
BH CV LOWER VASCULAR RIGHT POPLITEAL SPONT: NORMAL
BH CV LOWER VASCULAR RIGHT POSTERIOR TIBIAL COMPRESS: NORMAL
BH CV LOWER VASCULAR RIGHT PROXIMAL FEMORAL COMPRESS: NORMAL
BH CV LOWER VASCULAR RIGHT SAPHENOFEMORAL JUNCTION COMPRESS: NORMAL
BH CV VAS PRELIMINARY FINDINGS SCRIPTING: 1

## 2024-09-18 PROCEDURE — 93971 EXTREMITY STUDY: CPT

## 2025-02-27 ENCOUNTER — APPOINTMENT (OUTPATIENT)
Dept: GENERAL RADIOLOGY | Facility: HOSPITAL | Age: 50
End: 2025-02-27
Payer: COMMERCIAL

## 2025-02-27 ENCOUNTER — HOSPITAL ENCOUNTER (OUTPATIENT)
Facility: HOSPITAL | Age: 50
Setting detail: OBSERVATION
Discharge: HOME | End: 2025-02-28
Attending: EMERGENCY MEDICINE | Admitting: EMERGENCY MEDICINE
Payer: COMMERCIAL

## 2025-02-27 DIAGNOSIS — R42 DIZZINESS AND GIDDINESS: ICD-10-CM

## 2025-02-27 DIAGNOSIS — R07.89 CHEST PRESSURE: Primary | ICD-10-CM

## 2025-02-27 DIAGNOSIS — I07.9 TRICUSPID VALVE DISORDER: ICD-10-CM

## 2025-02-27 LAB
ALBUMIN SERPL-MCNC: 4.3 G/DL (ref 3.5–5.2)
ALBUMIN/GLOB SERPL: 1.4 G/DL
ALP SERPL-CCNC: 65 U/L (ref 39–117)
ALT SERPL W P-5'-P-CCNC: 8 U/L (ref 1–41)
ANION GAP SERPL CALCULATED.3IONS-SCNC: 9.5 MMOL/L (ref 5–15)
AST SERPL-CCNC: 33 U/L (ref 1–40)
BASOPHILS # BLD AUTO: 0.12 10*3/MM3 (ref 0–0.2)
BASOPHILS NFR BLD AUTO: 1.5 % (ref 0–1.5)
BILIRUB SERPL-MCNC: 0.6 MG/DL (ref 0–1.2)
BUN SERPL-MCNC: 10 MG/DL (ref 6–20)
BUN/CREAT SERPL: 12.8 (ref 7–25)
CALCIUM SPEC-SCNC: 9.3 MG/DL (ref 8.6–10.5)
CHLORIDE SERPL-SCNC: 106 MMOL/L (ref 98–107)
CO2 SERPL-SCNC: 25.5 MMOL/L (ref 22–29)
CREAT SERPL-MCNC: 0.78 MG/DL (ref 0.76–1.27)
D DIMER PPP FEU-MCNC: <0.27 MCGFEU/ML (ref 0–0.51)
DEPRECATED RDW RBC AUTO: 43.2 FL (ref 37–54)
EGFRCR SERPLBLD CKD-EPI 2021: 108 ML/MIN/1.73
EOSINOPHIL # BLD AUTO: 0.16 10*3/MM3 (ref 0–0.4)
EOSINOPHIL NFR BLD AUTO: 2 % (ref 0.3–6.2)
ERYTHROCYTE [DISTWIDTH] IN BLOOD BY AUTOMATED COUNT: 12.4 % (ref 12.3–15.4)
GEN 5 1HR TROPONIN T REFLEX: 6 NG/L
GLOBULIN UR ELPH-MCNC: 3 GM/DL
GLUCOSE SERPL-MCNC: 86 MG/DL (ref 65–99)
HCT VFR BLD AUTO: 45.6 % (ref 37.5–51)
HGB BLD-MCNC: 15.2 G/DL (ref 13–17.7)
HOLD SPECIMEN: NORMAL
HOLD SPECIMEN: NORMAL
IMM GRANULOCYTES # BLD AUTO: 0.02 10*3/MM3 (ref 0–0.05)
IMM GRANULOCYTES NFR BLD AUTO: 0.2 % (ref 0–0.5)
LYMPHOCYTES # BLD AUTO: 2.73 10*3/MM3 (ref 0.7–3.1)
LYMPHOCYTES NFR BLD AUTO: 33.8 % (ref 19.6–45.3)
MCH RBC QN AUTO: 31.7 PG (ref 26.6–33)
MCHC RBC AUTO-ENTMCNC: 33.3 G/DL (ref 31.5–35.7)
MCV RBC AUTO: 95 FL (ref 79–97)
MONOCYTES # BLD AUTO: 0.6 10*3/MM3 (ref 0.1–0.9)
MONOCYTES NFR BLD AUTO: 7.4 % (ref 5–12)
NEUTROPHILS NFR BLD AUTO: 4.44 10*3/MM3 (ref 1.7–7)
NEUTROPHILS NFR BLD AUTO: 55.1 % (ref 42.7–76)
NRBC BLD AUTO-RTO: 0 /100 WBC (ref 0–0.2)
NT-PROBNP SERPL-MCNC: <36 PG/ML (ref 0–900)
PLATELET # BLD AUTO: 284 10*3/MM3 (ref 140–450)
PMV BLD AUTO: 10.4 FL (ref 6–12)
POTASSIUM SERPL-SCNC: 4.6 MMOL/L (ref 3.5–5.2)
PROT SERPL-MCNC: 7.3 G/DL (ref 6–8.5)
RBC # BLD AUTO: 4.8 10*6/MM3 (ref 4.14–5.8)
SODIUM SERPL-SCNC: 141 MMOL/L (ref 136–145)
TROPONIN T NUMERIC DELTA: -1 NG/L
TROPONIN T SERPL HS-MCNC: 7 NG/L
WBC NRBC COR # BLD AUTO: 8.07 10*3/MM3 (ref 3.4–10.8)
WHOLE BLOOD HOLD COAG: NORMAL
WHOLE BLOOD HOLD SPECIMEN: NORMAL

## 2025-02-27 PROCEDURE — 83880 ASSAY OF NATRIURETIC PEPTIDE: CPT | Performed by: NURSE PRACTITIONER

## 2025-02-27 PROCEDURE — 93005 ELECTROCARDIOGRAM TRACING: CPT

## 2025-02-27 PROCEDURE — G0378 HOSPITAL OBSERVATION PER HR: HCPCS

## 2025-02-27 PROCEDURE — 85025 COMPLETE CBC W/AUTO DIFF WBC: CPT | Performed by: NURSE PRACTITIONER

## 2025-02-27 PROCEDURE — 84484 ASSAY OF TROPONIN QUANT: CPT | Performed by: NURSE PRACTITIONER

## 2025-02-27 PROCEDURE — 93005 ELECTROCARDIOGRAM TRACING: CPT | Performed by: EMERGENCY MEDICINE

## 2025-02-27 PROCEDURE — 85379 FIBRIN DEGRADATION QUANT: CPT | Performed by: NURSE PRACTITIONER

## 2025-02-27 PROCEDURE — 36415 COLL VENOUS BLD VENIPUNCTURE: CPT

## 2025-02-27 PROCEDURE — 99285 EMERGENCY DEPT VISIT HI MDM: CPT

## 2025-02-27 PROCEDURE — 80053 COMPREHEN METABOLIC PANEL: CPT | Performed by: NURSE PRACTITIONER

## 2025-02-27 PROCEDURE — 71046 X-RAY EXAM CHEST 2 VIEWS: CPT

## 2025-02-27 RX ORDER — POLYETHYLENE GLYCOL 3350 17 G/17G
17 POWDER, FOR SOLUTION ORAL DAILY PRN
Status: DISCONTINUED | OUTPATIENT
Start: 2025-02-27 | End: 2025-02-28 | Stop reason: HOSPADM

## 2025-02-27 RX ORDER — SODIUM CHLORIDE 0.9 % (FLUSH) 0.9 %
10 SYRINGE (ML) INJECTION AS NEEDED
Status: DISCONTINUED | OUTPATIENT
Start: 2025-02-27 | End: 2025-02-28 | Stop reason: HOSPADM

## 2025-02-27 RX ORDER — SODIUM CHLORIDE 9 MG/ML
40 INJECTION, SOLUTION INTRAVENOUS AS NEEDED
Status: DISCONTINUED | OUTPATIENT
Start: 2025-02-27 | End: 2025-02-28 | Stop reason: HOSPADM

## 2025-02-27 RX ORDER — ONDANSETRON 4 MG/1
4 TABLET, ORALLY DISINTEGRATING ORAL EVERY 6 HOURS PRN
Status: DISCONTINUED | OUTPATIENT
Start: 2025-02-27 | End: 2025-02-28 | Stop reason: HOSPADM

## 2025-02-27 RX ORDER — CARBIDOPA AND LEVODOPA 25; 100 MG/1; MG/1
1 TABLET ORAL EVERY 6 HOURS SCHEDULED
Status: DISCONTINUED | OUTPATIENT
Start: 2025-02-27 | End: 2025-02-28 | Stop reason: HOSPADM

## 2025-02-27 RX ORDER — SODIUM CHLORIDE 0.9 % (FLUSH) 0.9 %
10 SYRINGE (ML) INJECTION EVERY 12 HOURS SCHEDULED
Status: DISCONTINUED | OUTPATIENT
Start: 2025-02-27 | End: 2025-02-28 | Stop reason: HOSPADM

## 2025-02-27 RX ORDER — ALUMINA, MAGNESIA, AND SIMETHICONE 2400; 2400; 240 MG/30ML; MG/30ML; MG/30ML
15 SUSPENSION ORAL EVERY 6 HOURS PRN
Status: DISCONTINUED | OUTPATIENT
Start: 2025-02-27 | End: 2025-02-28 | Stop reason: HOSPADM

## 2025-02-27 RX ORDER — ONDANSETRON 2 MG/ML
4 INJECTION INTRAMUSCULAR; INTRAVENOUS EVERY 6 HOURS PRN
Status: DISCONTINUED | OUTPATIENT
Start: 2025-02-27 | End: 2025-02-28 | Stop reason: HOSPADM

## 2025-02-27 RX ORDER — ASPIRIN 81 MG/1
324 TABLET, CHEWABLE ORAL ONCE
Status: COMPLETED | OUTPATIENT
Start: 2025-02-27 | End: 2025-02-27

## 2025-02-27 RX ORDER — ASPIRIN 81 MG/1
81 TABLET ORAL DAILY
Status: DISCONTINUED | OUTPATIENT
Start: 2025-02-28 | End: 2025-02-28 | Stop reason: HOSPADM

## 2025-02-27 RX ORDER — NITROGLYCERIN 0.4 MG/1
0.4 TABLET SUBLINGUAL
Status: DISCONTINUED | OUTPATIENT
Start: 2025-02-27 | End: 2025-02-28 | Stop reason: HOSPADM

## 2025-02-27 RX ORDER — AMOXICILLIN 250 MG
2 CAPSULE ORAL 2 TIMES DAILY PRN
Status: DISCONTINUED | OUTPATIENT
Start: 2025-02-27 | End: 2025-02-28 | Stop reason: HOSPADM

## 2025-02-27 RX ORDER — ASPIRIN 81 MG/1
81 TABLET ORAL DAILY
COMMUNITY

## 2025-02-27 RX ORDER — BISACODYL 5 MG/1
5 TABLET, DELAYED RELEASE ORAL DAILY PRN
Status: DISCONTINUED | OUTPATIENT
Start: 2025-02-27 | End: 2025-02-28 | Stop reason: HOSPADM

## 2025-02-27 RX ORDER — BISACODYL 10 MG
10 SUPPOSITORY, RECTAL RECTAL DAILY PRN
Status: DISCONTINUED | OUTPATIENT
Start: 2025-02-27 | End: 2025-02-28 | Stop reason: HOSPADM

## 2025-02-27 RX ADMIN — ASPIRIN 324 MG: 81 TABLET, CHEWABLE ORAL at 13:58

## 2025-02-27 NOTE — ED NOTES
Nursing report ED to floor  Tray Henderson  49 y.o.  male    HPI:   Chief Complaint   Patient presents with    Chest Pain     Pt sent here by [facility] for CP since Monday, EKG in office was normal. Pt c/o pressure, no cardiac hx. Pt was seen for same CP with SOA at [facility] recently but d/c. Pt has CP currently but no SOA.        Admitting doctor:   Orlando Rico MD    Admitting diagnosis:   The primary encounter diagnosis was Chest pressure. A diagnosis of Tricuspid valve disorder was also pertinent to this visit.    Code status:   Current Code Status       Date Active Code Status Order ID Comments User Context       2/27/2025 1613 CPR (Attempt to Resuscitate) [id number]  April Ruggiero PA-C ED        Question Answer    Code Status (Patient has no pulse and is not breathing) CPR (Attempt to Resuscitate)    Medical Interventions (Patient has pulse or is breathing) Full Support                    Allergies:   Patient has no known allergies.    Isolation:  No active isolations     Fall Risk:  Fall Risk Assessment was completed, and patient is at low risk for falls.   Predictive Model Details         2 (Low) Factor Value    Calculated 2/27/2025 16:19 Age 49    Risk of Fall Model Active Peripheral IV Present     Imaging order in this encounter Present     Magnesium not on file     Tobacco Use Current     Issa Scale not on file     Diastolic BP 82     Clinically Relevant Sex Not Female     Number of Distinct Medication Classes administered 1     Total Bilirubin 0.6 mg/dL     Creatinine 0.78 mg/dL     Calcium 9.3 mg/dL     Potassium 4.6 mmol/L     ALT 8 U/L     Albumin 4.3 g/dL     Chloride 106 mmol/L     Days after Admission 0.11     Respiratory Rate 15         Weight:       02/27/25  1340   Weight: 73.8 kg (162 lb 11.2 oz)       Intake and Output  No intake or output data in the 24 hours ending 02/27/25 1636    Diet:        Most recent vitals:   Vitals:    02/27/25 1340 02/27/25 1408 02/27/25 1453 02/27/25 1611   BP:  "140/93 139/95 136/89 116/82   Pulse: 90 88 86 78   Resp: 18 15 17 15   Temp: 98.1 °F (36.7 °C)      SpO2: 98% 98% 99% 99%   Weight: 73.8 kg (162 lb 11.2 oz)      Height: 175.3 cm (69\")          Active LDAs/IV Access:   Lines, Drains & Airways       Active LDAs       Name Placement date Placement time Site Days    Peripheral IV 02/27/25 1406 Left Antecubital 02/27/25  1406  Antecubital  less than 1                    Skin Condition:   Skin Assessments (last day)       None             Labs (abnormal labs have a star):   Labs Reviewed   TROPONIN - Normal    Narrative:     High Sensitive Troponin T Reference Range:  <14.0 ng/L- Negative Female for AMI  <22.0 ng/L- Negative Male for AMI  >=14 - Abnormal Female indicating possible myocardial injury.  >=22 - Abnormal Male indicating possible myocardial injury.   Clinicians would have to utilize clinical acumen, EKG, Troponin, and serial changes to determine if it is an Acute Myocardial Infarction or myocardial injury due to an underlying chronic condition.        BNP (IN-HOUSE) - Normal    Narrative:     This assay is used as an aid in the diagnosis of individuals suspected of having heart failure. It can be used as an aid in the diagnosis of acute decompensated heart failure (ADHF) in patients presenting with signs and symptoms of ADHF to the emergency department (ED). In addition, NT-proBNP of <300 pg/mL indicates ADHF is not likely.    Age Range Result Interpretation  NT-proBNP Concentration (pg/mL:      <50             Positive            >450                   Gray                 300-450                    Negative             <300    50-75           Positive            >900                  Gray                300-900                  Negative            <300      >75             Positive            >1800                  Gray                300-1800                  Negative            <300   D-DIMER, QUANTITATIVE - Normal    Narrative:     According to the assay " "'s published package insert, a normal (<0.50 MCGFEU/mL) D-dimer result in conjunction with a non-high clinical probability assessment, excludes deep vein thrombosis (DVT) and pulmonary embolism (PE) with high sensitivity.    D-dimer values increase with age and this can make VTE exclusion of an older population difficult. To address this, the [employer], based on best available evidence and recent guidelines, recommends that clinicians use age-adjusted D-dimer thresholds in patients greater than 50 years of age with: a) a low probability of PE who do not meet all Pulmonary Embolism Rule Out Criteria, or b) in those with intermediate probability of PE.   The formula for an age-adjusted D-dimer cut-off is \"age/100\".  For example, a 60 year old patient would have an age-adjusted cut-off of 0.60 MCGFEU/mL and an 80 year old 0.80 MCGFEU/mL.   CBC WITH AUTO DIFFERENTIAL - Normal   HIGH SENSITIVITIY TROPONIN T 1HR - Normal    Narrative:     High Sensitive Troponin T Reference Range:  <14.0 ng/L- Negative Female for AMI  <22.0 ng/L- Negative Male for AMI  >=14 - Abnormal Female indicating possible myocardial injury.  >=22 - Abnormal Male indicating possible myocardial injury.   Clinicians would have to utilize clinical acumen, EKG, Troponin, and serial changes to determine if it is an Acute Myocardial Infarction or myocardial injury due to an underlying chronic condition.        RAINBOW DRAW    Narrative:     The following orders were created for panel order [facility] Draw.  Procedure                               Abnormality         Status                     ---------                               -----------         ------                     Green Top (Gel)[[id number]]                                  Final result               Lavender Top[[id number]]                                     Final result               Gold Top - SST[[id number]]                                   Final result        "        Light Blue Top[[id number]]                                   Final result                 Please view results for these tests on the individual orders.   COMPREHENSIVE METABOLIC PANEL    Narrative:     GFR Categories in Chronic Kidney Disease (CKD)      GFR Category          GFR (mL/min/1.73)    Interpretation  G1                     90 or greater         Normal or high (1)  G2                      60-89                Mild decrease (1)  G3a                   45-59                Mild to moderate decrease  G3b                   30-44                Moderate to severe decrease  G4                    15-29                Severe decrease  G5                    14 or less           Kidney failure          (1)In the absence of evidence of kidney disease, neither GFR category G1 or G2 fulfill the criteria for CKD.    eGFR calculation 2021 CKD-EPI creatinine equation, which does not include race as a factor   CBC AND DIFFERENTIAL    Narrative:     The following orders were created for panel order CBC & Differential.  Procedure                               Abnormality         Status                     ---------                               -----------         ------                     CBC Auto Differential[[id number]]        Normal              Final result                 Please view results for these tests on the individual orders.   GREEN TOP   LAVENDER TOP   GOLD TOP - [facility]   LIGHT BLUE TOP       LOC: Person, Place, Time, and Situation    Telemetry:  Observation Unit    Cardiac Monitoring Ordered: yes    EKG:   ECG 12 Lead   ED Interpretation   Madison Matthews APRN     2/27/2025  4:09 PM   ECG 12 Lead         Date/Time: 2/27/2025 1:41 PM      Performed by: Madison Matthews APRN   Authorized by: Madison Matthews APRN  Interpreted by ED physician   Comparison: compared with previous ECG from 7/11/2024   Similar to previous ECG   Comparison to previous ECG: Sinus rhythm rate of 78 normal axis no ectopy   Rhythm:  sinus rhythm   BPM: 86   Clinical impression: normal ECG      ECG 12 Lead Chest Pain   Preliminary Result   HEART RATE=86  bpm   RR Arcdxqfr=778  ms   RI Kfxfumvg=762  ms   P Horizontal Axis=24  deg   P Front Axis=65  deg   QRSD Interval=81  ms   QT Qnntabbc=074  ms   RPtZ=109  ms   QRS Axis=63  deg   T Wave Axis=44  deg   - NORMAL ECG -   Sinus rhythm   Date and Time of Study:2025-02-27 13:41:15          Medications Given in the ED:   Medications   sodium chloride 0.9 % flush 10 mL (has no administration in time range)   nitroglycerin (NITROSTAT) SL tablet 0.4 mg (has no administration in time range)   aspirin chewable tablet 324 mg (324 mg Oral Given 2/27/25 0008)       Imaging results:  XR Chest 2 View    Result Date: 2/27/2025  Impression: No acute process. Electronically Signed: Triston Anderson MD  2/27/2025 2:58 PM EST  Workstation ID: [id number]     Social issues:   Social History     Socioeconomic History    Marital status:    Tobacco Use    Smoking status: Every Day     Types: Cigarettes     Passive exposure: Current    Smokeless tobacco: Never    Tobacco comments:     Smokes 1 ppd x 30yrs   Vaping Use    Vaping status: Never Used   Substance and Sexual Activity    Alcohol use: Yes     Comment: OCC    Drug use: Never    Sexual activity: Defer       NIH Stroke Scale:  Interval: (not recorded)  1a. Level of Consciousness: (not recorded)  1b. LOC Questions: (not recorded)  1c. LOC Commands: (not recorded)  2. Best Gaze: (not recorded)  3. Visual: (not recorded)  4. Facial Palsy: (not recorded)  5a. Motor Arm, Left: (not recorded)  5b. Motor Arm, Right: (not recorded)  6a. Motor Leg, Left: (not recorded)  6b. Motor Leg, Right: (not recorded)  7. Limb Ataxia: (not recorded)  8. Sensory: (not recorded)  9. Best Language: (not recorded)  10. Dysarthria: (not recorded)  11. Extinction and Inattention (formerly Neglect): (not recorded)    Total (NIH Stroke Scale): (not recorded)     Additional notable assessment  information:.     Nursing report ED to floor:  Shane Tejada RN   02/27/25 16:36 EST

## 2025-02-27 NOTE — ED PROVIDER NOTES
Subjective   History of Present Illness  49-year-old male presents with a 4-day history of constant midsternal chest pressure with intermittent episodes of sharp pain.  He reports that it is nonradiating.  Patient reports that he has history of bicuspid valve regurgitation.  He has not seen cardiology for 15 years.  He reports that he was seen at [facility] for the same and discharged.  He had hospital follow-up with primary care today and was sent for further evaluation.    History provided by:  Patient      Review of Systems    Past Medical History:   Diagnosis Date    Parkinsons        No Known Allergies    Past Surgical History:   Procedure Laterality Date    KNEE SURGERY Right        No family history on file.    Social History     Socioeconomic History    Marital status:    Tobacco Use    Smoking status: Every Day     Types: Cigarettes     Passive exposure: Current    Smokeless tobacco: Never    Tobacco comments:     Smokes 1 ppd x 30yrs   Vaping Use    Vaping status: Never Used   Substance and Sexual Activity    Alcohol use: Yes     Comment: OCC    Drug use: Never    Sexual activity: Defer           Objective   Physical Exam  Vitals and nursing note reviewed.   Constitutional:       General: He is awake. He is not in acute distress.     Appearance: Normal appearance. He is well-developed and normal weight. He is not ill-appearing, toxic-appearing or diaphoretic.   HENT:      Head: Normocephalic and atraumatic.   Neck:      Thyroid: No thyromegaly.      Vascular: No JVD.      Trachea: No tracheal deviation.   Cardiovascular:      Rate and Rhythm: Normal rate and regular rhythm.      Pulses: Normal pulses.           Radial pulses are 2+ on the right side and 2+ on the left side.        Dorsalis pedis pulses are 2+ on the right side and 2+ on the left side.        Posterior tibial pulses are 2+ on the right side and 2+ on the left side.      Heart sounds: Normal heart sounds, S1 normal and S2 normal. Heart  sounds not distant. No murmur heard.     No friction rub. No gallop.   Pulmonary:      Effort: Pulmonary effort is normal. No respiratory distress.      Breath sounds: Normal breath sounds and air entry. No wheezing or rales.   Chest:      Chest wall: No tenderness.   Abdominal:      General: Bowel sounds are normal. There is no distension.      Palpations: Abdomen is soft. Abdomen is not rigid. There is no mass.      Tenderness: There is no abdominal tenderness. There is no guarding or rebound.      Hernia: No hernia is present.   Musculoskeletal:         General: No swelling or tenderness. Normal range of motion.      Cervical back: Normal range of motion and neck supple.      Right lower leg: No edema.      Left lower leg: No edema.   Skin:     General: Skin is warm and dry.      Capillary Refill: Capillary refill takes less than 2 seconds.      Coloration: Skin is not pale.      Findings: No bruising or rash.   Neurological:      Mental Status: He is alert and oriented to person, place, and time.   Psychiatric:         Behavior: Behavior is cooperative.         ECG 12 Lead      Date/Time: 2/27/2025 1:41 PM    Performed by: Madison Matthews APRN  Authorized by: Madison Matthews APRN  Interpreted by ED physician  Comparison: compared with previous ECG from 7/11/2024  Similar to previous ECG  Comparison to previous ECG: Sinus rhythm rate of 78 normal axis no ectopy  Rhythm: sinus rhythm  BPM: 86  Clinical impression: normal ECG             ED Course                  HEART Score: 2   Shared Decision Making  I discussed the findings with the patient/patient representative who is in agreement with the treatment plan and the final disposition.  Risks and benefits of discharge and/or observation/admission were discussed: Yes                                      Medical Decision Making  Amount and/or Complexity of Data Reviewed  External Data Reviewed: notes.     Details: Manuel Monique MD - 02/24/2025   Formatting of  this note might be different from the original.  CARDIOLOGY REPORT  FACILITY: [facility]    PATIENT NAME/: MAHI WILSON 1975  UNIT/AGE/GENDER: AGE: 49 YR GENDER: M  UNIT NUMBER: [id number]  ACCOUNT NUMBER: [id number]  ACCESSION NUMBER: [id number]    DATE OF EXAM: 2025 11:52  EXAMINATION(S): ECG 12-LEAD    FINAL REPORT    Procedure: ELECTROCARDIOGRAM RESULT  Heart Rate 78  P-R Interval 163 ms  QRS Interval 100 ms  QT Interval 391 ms  QTC Interval 445 ms  P Axis 68 deg  QRS Axis 65 deg  T Wave Axis 57 deg  DATE: 2025 11:52  Sinus rhythm  Minimal ST depression, lateral leads  Summary: Otherwise Normal ECG  Electronically signed by JOSHUA Monique 2025 19:12     CBC CMP within normal.  Troponin is 3 D-dimer within normal limit      Imaging Results - EC ECHO COMPLETE PANEL (2006 1:00 PM EST)  Narrative  2006 8:49 AM EST     HISTORY-  31 year old patient with a heart murmur and known aortic  insufficiency.    STUDY QUALITY-    RECORDED MEASUREMENTS                    (cm)    Normal Adult Values    Aortic root dimension                    3.5         (2.0-3.6)  Left atrial dimension                    3.3         (1.9-4.0)  Left ventricle diastolic dimension       5.3         (3.5-5.6)  Left ventricle systolic dimension        3.5         (2.5-4.0)  Interventricular septal thickness        0.9         (0.6-1.1)  LV posterior wall thickness              0.8         (0.6-1.1)  Right ventricular dimension              1.7         (0.7-2.3)    COMMENTS-  1. Left ventricle has normal size and normal regional wall thickening  and global systolic function with an ejection fraction greater than  55%.  2. Left atrium has normal size and appearance.  3. Right ventricle has normal size and appearance.  4. Right atrium has normal appearance.  5. Mitral valve has normal appearance with no regurgitation.  6. Aortic valve has normal thickness.  The opening of the valve is  "not  fully recorded so the distinction between a bicuspid and tricuspid  valve is not possible, however the overall appearance is suggestive of  a tricuspid valve.  Aortic regurgitation is seen.  Based on pressure  halftime this is mild to moderate in degree.  Based on the size of the  jet in the short axis parasternal view, it appears to be also mild to  moderate in degree.  7. Aortic root is upper normal in size.  8. No pericardial effusion or thickening.  9. In one view of the interatrial septum, there is a suggestion of some  color flow moving across the septal plane raising the possibility of a  small atrial septum defect.  There is no RV enlargement to suggest a  significant degree of shunt.  This is not noted in other views however.    CONCLUSIONS-  (1) Aortic regurgitation with a probable tricuspid valve.  The degree of aortic regurgitation appears mild to moderate.  There is  no evidence of left ventricular enlargement or impairment of LV  systolic function at this point.  (2) The possibility of a small atrial  septal defect cannot be excluded.  (3) There is a comparison study from  12/15/04 showing a left ventricular size of 5.1 in diastole and 3.7 in  systole.  Aortic regurgitation of a mild degree was described at that  time.        - KATIE WILLIAM      Reading Radiologist- NILESH DICKINSON MD      Released Date Time- 01/02/07 1713      Labs: ordered.  Radiology: ordered.  ECG/medicine tests: ordered and independent interpretation performed.    Risk  OTC drugs.  Prescription drug management.    Interpreted by radiologist as below:     XR Chest 2 View    Result Date: 2/27/2025  Impression: No acute process. Electronically Signed: Triston Anderson MD  2/27/2025 2:58 PM EST  Workstation ID: [id number]       /89   Pulse 86   Temp 98.1 °F (36.7 °C)   Resp 17   Ht 175.3 cm (69\")   Wt 73.8 kg (162 lb 11.2 oz)   SpO2 99%   BMI 24.03 kg/m²      Lab Results (last 24 hours)       Procedure " Component Value Units Date/Time    CBC & Differential [[id number]]  (Normal) Collected: 02/27/25 1407    Specimen: Blood Updated: 02/27/25 1414    Narrative:      The following orders were created for panel order CBC & Differential.  Procedure                               Abnormality         Status                     ---------                               -----------         ------                     CBC Auto Differential[[id number]]        Normal              Final result                 Please view results for these tests on the individual orders.    Comprehensive Metabolic Panel [[id number]] Collected: 02/27/25 1407    Specimen: Blood Updated: 02/27/25 1439     Glucose 86 mg/dL      BUN 10 mg/dL      Creatinine 0.78 mg/dL      Sodium 141 mmol/L      Potassium 4.6 mmol/L      Comment: Slight hemolysis detected by analyzer. Result may be falsely elevated.        Chloride 106 mmol/L      CO2 25.5 mmol/L      Calcium 9.3 mg/dL      Total Protein 7.3 g/dL      Albumin 4.3 g/dL      ALT (SGPT) 8 U/L      AST (SGOT) 33 U/L      Comment: Slight hemolysis detected by analyzer. Result may be falsely elevated.        Alkaline Phosphatase 65 U/L      Total Bilirubin 0.6 mg/dL      Globulin 3.0 gm/dL      A/G Ratio 1.4 g/dL      BUN/Creatinine Ratio 12.8     Anion Gap 9.5 mmol/L      eGFR 108.0 mL/min/1.73     Narrative:      GFR Categories in Chronic Kidney Disease (CKD)      GFR Category          GFR (mL/min/1.73)    Interpretation  G1                     90 or greater         Normal or high (1)  G2                      60-89                Mild decrease (1)  G3a                   45-59                Mild to moderate decrease  G3b                   30-44                Moderate to severe decrease  G4                    15-29                Severe decrease  G5                    14 or less           Kidney failure          (1)In the absence of evidence of kidney disease, neither GFR category G1 or G2 fulfill the criteria  for CKD.    eGFR calculation 2021 CKD-EPI creatinine equation, which does not include race as a factor    High Sensitivity Troponin T [[id number]]  (Normal) Collected: 02/27/25 1407    Specimen: Blood Updated: 02/27/25 1435     HS Troponin T 7 ng/L     Narrative:      High Sensitive Troponin T Reference Range:  <14.0 ng/L- Negative Female for AMI  <22.0 ng/L- Negative Male for AMI  >=14 - Abnormal Female indicating possible myocardial injury.  >=22 - Abnormal Male indicating possible myocardial injury.   Clinicians would have to utilize clinical acumen, EKG, Troponin, and serial changes to determine if it is an Acute Myocardial Infarction or myocardial injury due to an underlying chronic condition.         BNP [[id number]]  (Normal) Collected: 02/27/25 1407    Specimen: Blood Updated: 02/27/25 1435     proBNP <36.0 pg/mL     Narrative:      This assay is used as an aid in the diagnosis of individuals suspected of having heart failure. It can be used as an aid in the diagnosis of acute decompensated heart failure (ADHF) in patients presenting with signs and symptoms of ADHF to the emergency department (ED). In addition, NT-proBNP of <300 pg/mL indicates ADHF is not likely.    Age Range Result Interpretation  NT-proBNP Concentration (pg/mL:      <50             Positive            >450                   Gray                 300-450                    Negative             <300    50-75           Positive            >900                  Gray                300-900                  Negative            <300      >75             Positive            >1800                  Gray                300-1800                  Negative            <300    D-dimer, Quantitative [[id number]]  (Normal) Collected: 02/27/25 1407    Specimen: Blood Updated: 02/27/25 1428     D-Dimer, Quantitative <0.27 MCGFEU/mL     Narrative:      According to the assay 's published package insert, a normal (<0.50 MCGFEU/mL) D-dimer result in  "conjunction with a non-high clinical probability assessment, excludes deep vein thrombosis (DVT) and pulmonary embolism (PE) with high sensitivity.    D-dimer values increase with age and this can make VTE exclusion of an older population difficult. To address this, the [employer], based on best available evidence and recent guidelines, recommends that clinicians use age-adjusted D-dimer thresholds in patients greater than 50 years of age with: a) a low probability of PE who do not meet all Pulmonary Embolism Rule Out Criteria, or b) in those with intermediate probability of PE.   The formula for an age-adjusted D-dimer cut-off is \"age/100\".  For example, a 60 year old patient would have an age-adjusted cut-off of 0.60 MCGFEU/mL and an 80 year old 0.80 MCGFEU/mL.    CBC Auto Differential [[id number]]  (Normal) Collected: 02/27/25 1407    Specimen: Blood Updated: 02/27/25 1414     WBC 8.07 10*3/mm3      RBC 4.80 10*6/mm3      Hemoglobin 15.2 g/dL      Hematocrit 45.6 %      MCV 95.0 fL      MCH 31.7 pg      MCHC 33.3 g/dL      RDW 12.4 %      RDW-SD 43.2 fl      MPV 10.4 fL      Platelets 284 10*3/mm3      Neutrophil % 55.1 %      Lymphocyte % 33.8 %      Monocyte % 7.4 %      Eosinophil % 2.0 %      Basophil % 1.5 %      Immature Grans % 0.2 %      Neutrophils, Absolute 4.44 10*3/mm3      Lymphocytes, Absolute 2.73 10*3/mm3      Monocytes, Absolute 0.60 10*3/mm3      Eosinophils, Absolute 0.16 10*3/mm3      Basophils, Absolute 0.12 10*3/mm3      Immature Grans, Absolute 0.02 10*3/mm3      nRBC 0.0 /100 WBC     High Sensitivity Troponin T 1Hr [[id number]]  (Normal) Collected: 02/27/25 1500    Specimen: Blood Updated: 02/27/25 1520     HS Troponin T 6 ng/L      Troponin T Numeric Delta -1 ng/L     Narrative:      High Sensitive Troponin T Reference Range:  <14.0 ng/L- Negative Female for AMI  <22.0 ng/L- Negative Male for AMI  >=14 - Abnormal Female indicating possible myocardial injury.  >=22 - " Abnormal Male indicating possible myocardial injury.   Clinicians would have to utilize clinical acumen, EKG, Troponin, and serial changes to determine if it is an Acute Myocardial Infarction or myocardial injury due to an underlying chronic condition.                  Medications   sodium chloride 0.9 % flush 10 mL (has no administration in time range)   nitroglycerin (NITROSTAT) SL tablet 0.4 mg (has no administration in time range)   aspirin chewable tablet 324 mg (324 mg Oral Given 2/27/25 1358)        Appropriate PPE worn during patient exam.  Appropriate monitoring initiated. Patient is alert and oriented x3.  No acute distress noted. S1-S2 heart sounds on exam.  Lungs are clear to auscultation. No edema noted to the bilateral lower extremities.  IV established and labs obtained.  Cardiac work-up initiated.  Chest x-ray obtained with the above findings.  CBC CMP BNP D-dimer troponin all found to be unremarkable. Spoke with THOMAS Puentes who agrees patient should be placed in ED observation unit for further cardiac workup.    I reviewed chart 2/24/2025 patient was seen at [facility] for chest [facility]. My radiology interpretation of chest x-ray shows no cardiomegaly pulmonary edema infiltrate. Imaging was independently interpreted by Dr. Rico. Differential Diagnoses, not all-inclusive and does not constitute entirety of all causes: STEMI, non-STEMI, valve disease.  STEMI non-STEMI ruled out by no ST elevation on EKG and troponin results.  Patient will need echocardiogram to rule out valve disease which is not available in the ER.    Disposition/Treatment: Discussed results with patient, verbalized understanding.  Agreeable with plan of care. Patient was stable upon disposition.    Upon reassessment, patient is flesh tone warm and dry no acute distress noted.  Vital signs are stable.     Part of this note may be an electronic transcription/translation of spoken language to printed text using the Dragon  Dictation System.   Final diagnoses:   Chest pressure   Tricuspid valve disorder       ED Disposition  ED Disposition       ED Disposition   Decision to Admit    Condition   --    Comment   --               No follow-up provider specified.       Medication List      No changes were made to your prescriptions during this visit.            Madison Matthews, APRN  02/27/25 1603

## 2025-02-28 ENCOUNTER — APPOINTMENT (OUTPATIENT)
Dept: NUCLEAR MEDICINE | Facility: HOSPITAL | Age: 50
End: 2025-02-28
Payer: COMMERCIAL

## 2025-02-28 ENCOUNTER — APPOINTMENT (OUTPATIENT)
Dept: CARDIOLOGY | Facility: HOSPITAL | Age: 50
End: 2025-02-28
Payer: COMMERCIAL

## 2025-02-28 ENCOUNTER — APPOINTMENT (OUTPATIENT)
Dept: RESPIRATORY THERAPY | Facility: HOSPITAL | Age: 50
End: 2025-02-28
Payer: COMMERCIAL

## 2025-02-28 VITALS
WEIGHT: 163.14 LBS | TEMPERATURE: 98.1 F | OXYGEN SATURATION: 98 % | HEART RATE: 90 BPM | BODY MASS INDEX: 24.16 KG/M2 | RESPIRATION RATE: 20 BRPM | SYSTOLIC BLOOD PRESSURE: 119 MMHG | HEIGHT: 69 IN | DIASTOLIC BLOOD PRESSURE: 76 MMHG

## 2025-02-28 LAB
ANION GAP SERPL CALCULATED.3IONS-SCNC: 9.8 MMOL/L (ref 5–15)
AORTIC DIMENSIONLESS INDEX: 0.78 (DI)
AV MEAN PRESS GRAD SYS DOP V1V2: 2 MMHG
AV VMAX SYS DOP: 102 CM/SEC
BASOPHILS # BLD AUTO: 0.11 10*3/MM3 (ref 0–0.2)
BASOPHILS NFR BLD AUTO: 1.2 % (ref 0–1.5)
BH CV ECHO LEFT VENTRICLE GLOBAL LONGITUDINAL STRAIN: -16.3 %
BH CV ECHO MEAS - AI P1/2T: 747 MSEC
BH CV ECHO MEAS - AO MAX PG: 4.2 MMHG
BH CV ECHO MEAS - AO V2 VTI: 19.4 CM
BH CV ECHO MEAS - AVA(I,D): 3.6 CM2
BH CV ECHO MEAS - EDV(CUBED): 140.6 ML
BH CV ECHO MEAS - EDV(MOD-SP4): 105 ML
BH CV ECHO MEAS - EF(MOD-SP4): 64.6 %
BH CV ECHO MEAS - ESV(CUBED): 42.9 ML
BH CV ECHO MEAS - ESV(MOD-SP4): 37.2 ML
BH CV ECHO MEAS - FS: 32.7 %
BH CV ECHO MEAS - IVS/LVPW: 1 CM
BH CV ECHO MEAS - IVSD: 1 CM
BH CV ECHO MEAS - LA DIMENSION: 3 CM
BH CV ECHO MEAS - LAT PEAK E' VEL: 10.7 CM/SEC
BH CV ECHO MEAS - LV DIASTOLIC VOL/BSA (35-75): 55.4 CM2
BH CV ECHO MEAS - LV MASS(C)D: 194.2 GRAMS
BH CV ECHO MEAS - LV MAX PG: 2.5 MMHG
BH CV ECHO MEAS - LV MEAN PG: 1 MMHG
BH CV ECHO MEAS - LV SYSTOLIC VOL/BSA (12-30): 19.6 CM2
BH CV ECHO MEAS - LV V1 MAX: 79.5 CM/SEC
BH CV ECHO MEAS - LV V1 VTI: 16.7 CM
BH CV ECHO MEAS - LVIDD: 5.2 CM
BH CV ECHO MEAS - LVIDS: 3.5 CM
BH CV ECHO MEAS - LVOT AREA: 4.2 CM2
BH CV ECHO MEAS - LVOT DIAM: 2.3 CM
BH CV ECHO MEAS - LVPWD: 1 CM
BH CV ECHO MEAS - MED PEAK E' VEL: 9 CM/SEC
BH CV ECHO MEAS - MV A DUR: 0.12 SEC
BH CV ECHO MEAS - MV A MAX VEL: 41.4 CM/SEC
BH CV ECHO MEAS - MV DEC SLOPE: 399 CM/SEC2
BH CV ECHO MEAS - MV DEC TIME: 0.22 SEC
BH CV ECHO MEAS - MV E MAX VEL: 56.2 CM/SEC
BH CV ECHO MEAS - MV E/A: 1.36
BH CV ECHO MEAS - MV MAX PG: 2.23 MMHG
BH CV ECHO MEAS - MV MEAN PG: 1 MMHG
BH CV ECHO MEAS - MV P1/2T: 56.5 MSEC
BH CV ECHO MEAS - MV V2 VTI: 24 CM
BH CV ECHO MEAS - MVA(P1/2T): 3.9 CM2
BH CV ECHO MEAS - MVA(VTI): 2.9 CM2
BH CV ECHO MEAS - PA ACC TIME: 0.15 SEC
BH CV ECHO MEAS - PA V2 MAX: 91.3 CM/SEC
BH CV ECHO MEAS - RV MAX PG: 0.92 MMHG
BH CV ECHO MEAS - RV V1 MAX: 47.9 CM/SEC
BH CV ECHO MEAS - RV V1 VTI: 12.2 CM
BH CV ECHO MEAS - SV(LVOT): 69.4 ML
BH CV ECHO MEAS - SV(MOD-SP4): 67.8 ML
BH CV ECHO MEAS - SVI(LVOT): 36.6 ML/M2
BH CV ECHO MEAS - SVI(MOD-SP4): 35.8 ML/M2
BH CV ECHO MEAS - TAPSE (>1.6): 2.17 CM
BH CV ECHO MEASUREMENTS AVERAGE E/E' RATIO: 5.71
BH CV REST NUCLEAR ISOTOPE DOSE: 11 MCI
BH CV STRESS BP STAGE 1: NORMAL
BH CV STRESS BP STAGE 2: NORMAL
BH CV STRESS BP STAGE 3: NORMAL
BH CV STRESS DURATION MIN STAGE 1: 3
BH CV STRESS DURATION MIN STAGE 2: 3
BH CV STRESS DURATION MIN STAGE 3: 2
BH CV STRESS DURATION SEC STAGE 1: 0
BH CV STRESS DURATION SEC STAGE 2: 0
BH CV STRESS DURATION SEC STAGE 3: 46
BH CV STRESS GRADE STAGE 1: 10
BH CV STRESS GRADE STAGE 2: 12
BH CV STRESS GRADE STAGE 3: 14
BH CV STRESS HR STAGE 1: 97
BH CV STRESS HR STAGE 2: 117
BH CV STRESS HR STAGE 3: 145
BH CV STRESS METS STAGE 1: 5
BH CV STRESS METS STAGE 2: 7.5
BH CV STRESS METS STAGE 3: 10
BH CV STRESS NUCLEAR ISOTOPE DOSE: 31.1 MCI
BH CV STRESS PROTOCOL 1: NORMAL
BH CV STRESS RECOVERY BP: NORMAL MMHG
BH CV STRESS RECOVERY HR: 90 BPM
BH CV STRESS SPEED STAGE 1: 1.7
BH CV STRESS SPEED STAGE 2: 2.5
BH CV STRESS SPEED STAGE 3: 3.4
BH CV STRESS STAGE 1: 1
BH CV STRESS STAGE 2: 2
BH CV STRESS STAGE 3: 3
BH CV XLRA - TDI S': 10.2 CM/SEC
BUN SERPL-MCNC: 14 MG/DL (ref 6–20)
BUN/CREAT SERPL: 19.7 (ref 7–25)
CALCIUM SPEC-SCNC: 9 MG/DL (ref 8.6–10.5)
CHLORIDE SERPL-SCNC: 107 MMOL/L (ref 98–107)
CO2 SERPL-SCNC: 24.2 MMOL/L (ref 22–29)
CREAT SERPL-MCNC: 0.71 MG/DL (ref 0.76–1.27)
DEPRECATED RDW RBC AUTO: 43.6 FL (ref 37–54)
EGFRCR SERPLBLD CKD-EPI 2021: 111.1 ML/MIN/1.73
EOSINOPHIL # BLD AUTO: 0.33 10*3/MM3 (ref 0–0.4)
EOSINOPHIL NFR BLD AUTO: 3.6 % (ref 0.3–6.2)
ERYTHROCYTE [DISTWIDTH] IN BLOOD BY AUTOMATED COUNT: 12.5 % (ref 12.3–15.4)
GLUCOSE SERPL-MCNC: 108 MG/DL (ref 65–99)
HCT VFR BLD AUTO: 41.9 % (ref 37.5–51)
HGB BLD-MCNC: 14.1 G/DL (ref 13–17.7)
IMM GRANULOCYTES # BLD AUTO: 0.03 10*3/MM3 (ref 0–0.05)
IMM GRANULOCYTES NFR BLD AUTO: 0.3 % (ref 0–0.5)
LV EF 3D SEGMENTATION: 63 %
LYMPHOCYTES # BLD AUTO: 3.39 10*3/MM3 (ref 0.7–3.1)
LYMPHOCYTES NFR BLD AUTO: 36.9 % (ref 19.6–45.3)
MAXIMAL PREDICTED HEART RATE: 169 BPM
MCH RBC QN AUTO: 31.8 PG (ref 26.6–33)
MCHC RBC AUTO-ENTMCNC: 33.7 G/DL (ref 31.5–35.7)
MCV RBC AUTO: 94.6 FL (ref 79–97)
MONOCYTES # BLD AUTO: 0.72 10*3/MM3 (ref 0.1–0.9)
MONOCYTES NFR BLD AUTO: 7.8 % (ref 5–12)
NEUTROPHILS NFR BLD AUTO: 4.61 10*3/MM3 (ref 1.7–7)
NEUTROPHILS NFR BLD AUTO: 50.2 % (ref 42.7–76)
NRBC BLD AUTO-RTO: 0 /100 WBC (ref 0–0.2)
PERCENT MAX PREDICTED HR: 86.39 %
PLATELET # BLD AUTO: 289 10*3/MM3 (ref 140–450)
PMV BLD AUTO: 10.8 FL (ref 6–12)
POTASSIUM SERPL-SCNC: 3.9 MMOL/L (ref 3.5–5.2)
QT INTERVAL: 354 MS
QTC INTERVAL: 423 MS
RBC # BLD AUTO: 4.43 10*6/MM3 (ref 4.14–5.8)
SINUS: 4 CM
SODIUM SERPL-SCNC: 141 MMOL/L (ref 136–145)
SPECT HRT GATED+EF W RNC IV: 63 %
STJ: 3.9 CM
STRESS BASELINE BP: NORMAL MMHG
STRESS BASELINE HR: 76 BPM
STRESS PERCENT HR: 102 %
STRESS POST ESTIMATED WORKLOAD: 10.2 METS
STRESS POST EXERCISE DUR MIN: 8 MIN
STRESS POST EXERCISE DUR SEC: 46 SEC
STRESS POST PEAK BP: NORMAL MMHG
STRESS POST PEAK HR: 146 BPM
STRESS TARGET HR: 144 BPM
WBC NRBC COR # BLD AUTO: 9.19 10*3/MM3 (ref 3.4–10.8)

## 2025-02-28 PROCEDURE — 34310000005 TECHNETIUM TETROFOSMIN KIT: Performed by: EMERGENCY MEDICINE

## 2025-02-28 PROCEDURE — G0378 HOSPITAL OBSERVATION PER HR: HCPCS

## 2025-02-28 PROCEDURE — 93018 CV STRESS TEST I&R ONLY: CPT | Performed by: INTERNAL MEDICINE

## 2025-02-28 PROCEDURE — 93356 MYOCRD STRAIN IMG SPCKL TRCK: CPT | Performed by: INTERNAL MEDICINE

## 2025-02-28 PROCEDURE — 85025 COMPLETE CBC W/AUTO DIFF WBC: CPT | Performed by: PHYSICIAN ASSISTANT

## 2025-02-28 PROCEDURE — 93306 TTE W/DOPPLER COMPLETE: CPT

## 2025-02-28 PROCEDURE — 78452 HT MUSCLE IMAGE SPECT MULT: CPT

## 2025-02-28 PROCEDURE — 93017 CV STRESS TEST TRACING ONLY: CPT

## 2025-02-28 PROCEDURE — 78452 HT MUSCLE IMAGE SPECT MULT: CPT | Performed by: INTERNAL MEDICINE

## 2025-02-28 PROCEDURE — 93306 TTE W/DOPPLER COMPLETE: CPT | Performed by: INTERNAL MEDICINE

## 2025-02-28 PROCEDURE — 80048 BASIC METABOLIC PNL TOTAL CA: CPT | Performed by: PHYSICIAN ASSISTANT

## 2025-02-28 PROCEDURE — A9502 TC99M TETROFOSMIN: HCPCS | Performed by: EMERGENCY MEDICINE

## 2025-02-28 PROCEDURE — 93246 EXT ECG>7D<15D RECORDING: CPT

## 2025-02-28 PROCEDURE — 99204 OFFICE O/P NEW MOD 45 MIN: CPT | Performed by: INTERNAL MEDICINE

## 2025-02-28 PROCEDURE — 93356 MYOCRD STRAIN IMG SPCKL TRCK: CPT

## 2025-02-28 RX ORDER — ACETAMINOPHEN 325 MG/1
650 TABLET ORAL EVERY 6 HOURS PRN
Status: DISCONTINUED | OUTPATIENT
Start: 2025-02-28 | End: 2025-02-28 | Stop reason: HOSPADM

## 2025-02-28 RX ADMIN — Medication 10 ML: at 10:12

## 2025-02-28 RX ADMIN — ASPIRIN 81 MG: 81 TABLET, COATED ORAL at 10:12

## 2025-02-28 RX ADMIN — CARBIDOPA AND LEVODOPA 1 TABLET: 25; 100 TABLET ORAL at 10:51

## 2025-02-28 RX ADMIN — TETROFOSMIN 1 DOSE: 1.38 INJECTION, POWDER, LYOPHILIZED, FOR SOLUTION INTRAVENOUS at 09:00

## 2025-02-28 RX ADMIN — TETROFOSMIN 1 DOSE: 1.38 INJECTION, POWDER, LYOPHILIZED, FOR SOLUTION INTRAVENOUS at 06:40

## 2025-02-28 RX ADMIN — CARBIDOPA AND LEVODOPA 1 TABLET: 25; 100 TABLET ORAL at 06:00

## 2025-02-28 NOTE — CONSULTS
Referring Provider: Orlando Rico MD  Reason for Consultation:  Dizziness  Chest pain  Bicuspid aortic valve  Aortic regurgitation    Patient Care Team:  Sydnee Rodriges APRN as PCP - General (Family Medicine)    Chief complaint  Chest pain    Subjective .     History of present illness:  Tray Henderson is a 49 y.o. male who presents with history of chest discomfort somewhat constant substernal pressure sensation with intermittent sharp pain.  Denies having any sweating nausea vomiting shortness of breath.  Patient has history of bicuspid aortic valve and aortic valve regurgitation.  Patient needs to be seen by cardiologist in [place] and has not seen anybody for the last 15 years.  No other associated aggravating or elevating factors.  Cardiology consultation was requested.    ROS      Today, the patient has been without any chest discomfort ,shortness of breath, palpitations, dizziness or syncope.  Denies having any headache ,abdominal pain ,nausea, vomiting , diarrhea constipation, loss of weight or loss of appetite.  Denies having any excessive bruising ,hematuria or blood in the stool.    Review of all systems negative except as indicated.    Reviewed ROS.    History  Past Medical History:   Diagnosis Date    Parkinsons        Past Surgical History:   Procedure Laterality Date    KNEE SURGERY Right        History reviewed. No pertinent family history.    Social History     Tobacco Use    Smoking status: Former     Current packs/day: 0.00     Types: Cigarettes     Quit date: 2025     Years since quittin.0     Passive exposure: Current    Smokeless tobacco: Never    Tobacco comments:     Smokes 1 ppd x 30yrs   Vaping Use    Vaping status: Never Used   Substance Use Topics    Alcohol use: Yes     Comment: OCC    Drug use: Never        Medications Prior to Admission   Medication Sig Dispense Refill Last Dose/Taking    aspirin 81 MG EC tablet Take 1 tablet by mouth Daily.   2025     "carbidopa-levodopa (SINEMET)  MG per tablet Take 1 tablet by mouth Every 3 (Three) Hours. While awake   2/27/2025    azithromycin (Zithromax Z-Jean) 250 MG tablet Take1 tablet daily for 4 days. 4 tablet 0     benzonatate (TESSALON) 100 MG capsule Take 1 capsule by mouth 2 (Two) Times a Day As Needed for Cough. 20 capsule 0          Patient has no known allergies.    Scheduled Meds:aspirin, 81 mg, Oral, Daily  carbidopa-levodopa, 1 tablet, Oral, Q6H  sodium chloride, 10 mL, Intravenous, Q12H      Continuous Infusions:   PRN Meds:.  acetaminophen    aluminum-magnesium hydroxide-simethicone    senna-docusate sodium **AND** polyethylene glycol **AND** bisacodyl **AND** bisacodyl    nitroglycerin    ondansetron ODT **OR** ondansetron    sodium chloride    sodium chloride    sodium chloride    Objective     VITAL SIGNS  Vitals:    02/28/25 0751 02/28/25 0752 02/28/25 0753 02/28/25 0803   BP:    98/61   BP Location:    Left arm   Patient Position:    Lying   Pulse: 74 69 71 71   Resp:    23   Temp:    97.9 °F (36.6 °C)   TempSrc:    Oral   SpO2: 96% 96% 97% 99%   Weight:       Height:           Flowsheet Rows      Flowsheet Row First Filed Value   Admission Height 175.3 cm (69\") Documented at 02/27/2025 1340   Admission Weight 73.8 kg (162 lb 11.2 oz) Documented at 02/27/2025 1340            No intake or output data in the 24 hours ending 02/28/25 0821     TELEMETRY: Sinus rhythm    Physical Exam:  The patient is alert, oriented and in no distress.  Vital signs as noted above.  Head and neck revealed no carotid bruits or jugular venous distention.  No thyromegaly or lymphadenopathy is present  Lungs clear.  No wheezing.  Breath sounds are normal bilaterally.  Heart normal first and second heart sounds.  Grade 2/6 systolic murmur.  No precordial rub is present.  No gallop is present.  Abdomen soft and nontender.  No organomegaly is present.  Extremities with good peripheral pulses without any pedal edema.  Skin warm " and dry.  Musculoskeletal system is grossly normal  CNS grossly normal      Results Review:   I reviewed the patient's new clinical results.  Lab Results (last 24 hours)       Procedure Component Value Units Date/Time    Basic Metabolic Panel [[id number]]  (Abnormal) Collected: 02/28/25 0301    Specimen: Blood Updated: 02/28/25 0337     Glucose 108 mg/dL      BUN 14 mg/dL      Creatinine 0.71 mg/dL      Sodium 141 mmol/L      Potassium 3.9 mmol/L      Chloride 107 mmol/L      CO2 24.2 mmol/L      Calcium 9.0 mg/dL      BUN/Creatinine Ratio 19.7     Anion Gap 9.8 mmol/L      eGFR 111.1 mL/min/1.73     Narrative:      GFR Categories in Chronic Kidney Disease (CKD)      GFR Category          GFR (mL/min/1.73)    Interpretation  G1                     90 or greater         Normal or high (1)  G2                      60-89                Mild decrease (1)  G3a                   45-59                Mild to moderate decrease  G3b                   30-44                Moderate to severe decrease  G4                    15-29                Severe decrease  G5                    14 or less           Kidney failure          (1)In the absence of evidence of kidney disease, neither GFR category G1 or G2 fulfill the criteria for CKD.    eGFR calculation 2021 CKD-EPI creatinine equation, which does not include race as a factor    CBC & Differential [[id number]]  (Abnormal) Collected: 02/28/25 0301    Specimen: Blood Updated: 02/28/25 0326    Narrative:      The following orders were created for panel order CBC & Differential.  Procedure                               Abnormality         Status                     ---------                               -----------         ------                     CBC Auto Differential[[id number]]        Abnormal            Final result                 Please view results for these tests on the individual orders.    CBC Auto Differential [[id number]]  (Abnormal) Collected: 02/28/25 0301    Specimen:  Blood Updated: 02/28/25 0326     WBC 9.19 10*3/mm3      RBC 4.43 10*6/mm3      Hemoglobin 14.1 g/dL      Hematocrit 41.9 %      MCV 94.6 fL      MCH 31.8 pg      MCHC 33.7 g/dL      RDW 12.5 %      RDW-SD 43.6 fl      MPV 10.8 fL      Platelets 289 10*3/mm3      Neutrophil % 50.2 %      Lymphocyte % 36.9 %      Monocyte % 7.8 %      Eosinophil % 3.6 %      Basophil % 1.2 %      Immature Grans % 0.3 %      Neutrophils, Absolute 4.61 10*3/mm3      Lymphocytes, Absolute 3.39 10*3/mm3      Monocytes, Absolute 0.72 10*3/mm3      Eosinophils, Absolute 0.33 10*3/mm3      Basophils, Absolute 0.11 10*3/mm3      Immature Grans, Absolute 0.03 10*3/mm3      nRBC 0.0 /100 WBC     High Sensitivity Troponin T 1Hr [[id number]]  (Normal) Collected: 02/27/25 1500    Specimen: Blood Updated: 02/27/25 1520     HS Troponin T 6 ng/L      Troponin T Numeric Delta -1 ng/L     Narrative:      High Sensitive Troponin T Reference Range:  <14.0 ng/L- Negative Female for AMI  <22.0 ng/L- Negative Male for AMI  >=14 - Abnormal Female indicating possible myocardial injury.  >=22 - Abnormal Male indicating possible myocardial injury.   Clinicians would have to utilize clinical acumen, EKG, Troponin, and serial changes to determine if it is an Acute Myocardial Infarction or myocardial injury due to an underlying chronic condition.         Comprehensive Metabolic Panel [[id number]] Collected: 02/27/25 1407    Specimen: Blood Updated: 02/27/25 1439     Glucose 86 mg/dL      BUN 10 mg/dL      Creatinine 0.78 mg/dL      Sodium 141 mmol/L      Potassium 4.6 mmol/L      Comment: Slight hemolysis detected by analyzer. Result may be falsely elevated.        Chloride 106 mmol/L      CO2 25.5 mmol/L      Calcium 9.3 mg/dL      Total Protein 7.3 g/dL      Albumin 4.3 g/dL      ALT (SGPT) 8 U/L      AST (SGOT) 33 U/L      Comment: Slight hemolysis detected by analyzer. Result may be falsely elevated.        Alkaline Phosphatase 65 U/L      Total Bilirubin 0.6  mg/dL      Globulin 3.0 gm/dL      A/G Ratio 1.4 g/dL      BUN/Creatinine Ratio 12.8     Anion Gap 9.5 mmol/L      eGFR 108.0 mL/min/1.73     Narrative:      GFR Categories in Chronic Kidney Disease (CKD)      GFR Category          GFR (mL/min/1.73)    Interpretation  G1                     90 or greater         Normal or high (1)  G2                      60-89                Mild decrease (1)  G3a                   45-59                Mild to moderate decrease  G3b                   30-44                Moderate to severe decrease  G4                    15-29                Severe decrease  G5                    14 or less           Kidney failure          (1)In the absence of evidence of kidney disease, neither GFR category G1 or G2 fulfill the criteria for CKD.    eGFR calculation 2021 CKD-EPI creatinine equation, which does not include race as a factor    High Sensitivity Troponin T [[id number]]  (Normal) Collected: 02/27/25 1407    Specimen: Blood Updated: 02/27/25 1435     HS Troponin T 7 ng/L     Narrative:      High Sensitive Troponin T Reference Range:  <14.0 ng/L- Negative Female for AMI  <22.0 ng/L- Negative Male for AMI  >=14 - Abnormal Female indicating possible myocardial injury.  >=22 - Abnormal Male indicating possible myocardial injury.   Clinicians would have to utilize clinical acumen, EKG, Troponin, and serial changes to determine if it is an Acute Myocardial Infarction or myocardial injury due to an underlying chronic condition.         BNP [[id number]]  (Normal) Collected: 02/27/25 1407    Specimen: Blood Updated: 02/27/25 1435     proBNP <36.0 pg/mL     Narrative:      This assay is used as an aid in the diagnosis of individuals suspected of having heart failure. It can be used as an aid in the diagnosis of acute decompensated heart failure (ADHF) in patients presenting with signs and symptoms of ADHF to the emergency department (ED). In addition, NT-proBNP of <300 pg/mL indicates ADHF is not  "likely.    Age Range Result Interpretation  NT-proBNP Concentration (pg/mL:      <50             Positive            >450                   Gray                 300-450                    Negative             <300    50-75           Positive            >900                  Gray                300-900                  Negative            <300      >75             Positive            >1800                  Gray                300-1800                  Negative            <300    D-dimer, Quantitative [[id number]]  (Normal) Collected: 02/27/25 1407    Specimen: Blood Updated: 02/27/25 1428     D-Dimer, Quantitative <0.27 MCGFEU/mL     Narrative:      According to the assay 's published package insert, a normal (<0.50 MCGFEU/mL) D-dimer result in conjunction with a non-high clinical probability assessment, excludes deep vein thrombosis (DVT) and pulmonary embolism (PE) with high sensitivity.    D-dimer values increase with age and this can make VTE exclusion of an older population difficult. To address this, the [employer], based on best available evidence and recent guidelines, recommends that clinicians use age-adjusted D-dimer thresholds in patients greater than 50 years of age with: a) a low probability of PE who do not meet all Pulmonary Embolism Rule Out Criteria, or b) in those with intermediate probability of PE.   The formula for an age-adjusted D-dimer cut-off is \"age/100\".  For example, a 60 year old patient would have an age-adjusted cut-off of 0.60 MCGFEU/mL and an 80 year old 0.80 MCGFEU/mL.    [place] Draw [[id number]] Collected: 02/27/25 1407    Specimen: Blood Updated: 02/27/25 1415    Narrative:      The following orders were created for panel order [place] Draw.  Procedure                               Abnormality         Status                     ---------                               -----------         ------                     Green Top (Gel)[[id number]]            "                       Final result               Lavender Top[[id number]]                                     Final result               Gold Top - SST[[id number]]                                   Final result               Light Blue Top[[id number]]                                   Final result                 Please view results for these tests on the individual orders.    Green Top (Gel) [[id number]] Collected: 02/27/25 1407    Specimen: Blood Updated: 02/27/25 1415     Extra Tube Hold for add-ons.     Comment: Auto resulted.       Lavender Top [[id number]] Collected: 02/27/25 1407    Specimen: Blood Updated: 02/27/25 1415     Extra Tube hold for add-on     Comment: Auto resulted       Gold Top - SST [[id number]] Collected: 02/27/25 1407    Specimen: Blood Updated: 02/27/25 1415     Extra Tube Hold for add-ons.     Comment: Auto resulted.       Light Blue Top [[id number]] Collected: 02/27/25 1407    Specimen: Blood Updated: 02/27/25 1415     Extra Tube Hold for add-ons.     Comment: Auto resulted       CBC & Differential [[id number]]  (Normal) Collected: 02/27/25 1407    Specimen: Blood Updated: 02/27/25 1414    Narrative:      The following orders were created for panel order CBC & Differential.  Procedure                               Abnormality         Status                     ---------                               -----------         ------                     CBC Auto Differential[[id number]]        Normal              Final result                 Please view results for these tests on the individual orders.    CBC Auto Differential [[id number]]  (Normal) Collected: 02/27/25 1407    Specimen: Blood Updated: 02/27/25 1414     WBC 8.07 10*3/mm3      RBC 4.80 10*6/mm3      Hemoglobin 15.2 g/dL      Hematocrit 45.6 %      MCV 95.0 fL      MCH 31.7 pg      MCHC 33.3 g/dL      RDW 12.4 %      RDW-SD 43.2 fl      MPV 10.4 fL      Platelets 284 10*3/mm3      Neutrophil % 55.1 %      Lymphocyte % 33.8 %      Monocyte %  7.4 %      Eosinophil % 2.0 %      Basophil % 1.5 %      Immature Grans % 0.2 %      Neutrophils, Absolute 4.44 10*3/mm3      Lymphocytes, Absolute 2.73 10*3/mm3      Monocytes, Absolute 0.60 10*3/mm3      Eosinophils, Absolute 0.16 10*3/mm3      Basophils, Absolute 0.12 10*3/mm3      Immature Grans, Absolute 0.02 10*3/mm3      nRBC 0.0 /100 WBC             Imaging Results (Last 24 Hours)       Procedure Component Value Units Date/Time    XR Chest 2 View [[id number]] Collected: 02/27/25 1457     Updated: 02/27/25 1500    Narrative:      XR CHEST 2 VW    Date of Exam: 2/27/2025 2:37 PM EST    Indication: Chest Pain Protocol  Chest Pain Protocol    Comparison: 7/11/2024    Findings:  The cardiomediastinal silhouette is within normal limits. Lungs are clear. No focal consolidation, pneumothorax, or significant pleural effusion. Osseous structures grossly intact.      Impression:      Impression:  No acute process.          Electronically Signed: Triston Anderson MD    2/27/2025 2:58 PM EST    Workstation ID: [id number]        LAB RESULTS (LAST 7 DAYS)    CBC  Results from last 7 days   Lab Units 02/28/25  0301 02/27/25  1407   WBC 10*3/mm3 9.19 8.07   RBC 10*6/mm3 4.43 4.80   HEMOGLOBIN g/dL 14.1 15.2   HEMATOCRIT % 41.9 45.6   MCV fL 94.6 95.0   PLATELETS 10*3/mm3 289 284       BMP  Results from last 7 days   Lab Units 02/28/25  0301 02/27/25  1407   SODIUM mmol/L 141 141   POTASSIUM mmol/L 3.9 4.6   CHLORIDE mmol/L 107 106   CO2 mmol/L 24.2 25.5   BUN mg/dL 14 10   CREATININE mg/dL 0.71* 0.78   GLUCOSE mg/dL 108* 86       CMP   Results from last 7 days   Lab Units 02/28/25  0301 02/27/25  1407   SODIUM mmol/L 141 141   POTASSIUM mmol/L 3.9 4.6   CHLORIDE mmol/L 107 106   CO2 mmol/L 24.2 25.5   BUN mg/dL 14 10   CREATININE mg/dL 0.71* 0.78   GLUCOSE mg/dL 108* 86   ALBUMIN g/dL  --  4.3   BILIRUBIN mg/dL  --  0.6   ALK PHOS U/L  --  65   AST (SGOT) U/L  --  33   ALT (SGPT) U/L  --  8         BNP        TROPONIN  Results from  last 7 days   Lab Units 02/27/25  1500   HSTROP T ng/L 6       CoAg        Creatinine Clearance  Estimated Creatinine Clearance: 128.8 mL/min (A) (by C-G formula based on SCr of 0.71 mg/dL (L)).    ABG        Radiology  XR Chest 2 View    Result Date: 2/27/2025  Impression: No acute process. Electronically Signed: Triston Anderson MD  2/27/2025 2:58 PM EST  Workstation ID: [id number]       EKG      I personally viewed and interpreted the patient's EKG/Telemetry data: Sinus rhythm    ECHOCARDIOGRAM:        STRESS TEST        Cardiolite (Tc-99m sestamibi) stress test    HEART CATHETERIZATION  No results found for this or any previous visit.      OTHER:     Assessment & Plan     Principal Problem:    Chest pressure    ]]]]]]]]]]]]]]]]]]]]]]]]  Impression  ============  -Chest discomfort-somewhat atypical.    - History of dizziness and possible near syncope.    - History of bicuspid aortic valve.  Moderate aortic regurgitation    - Status post knee surgery    - Former smoker    - No known allergies    Echocardiogram 2/28/2025  Aortic valve is thickened with adequate opening motion.  Moderate aortic regurgitation is present.  Probable bicuspid aortic valve.  Left ventricular size and contractility is normal with ejection fraction of 60%.  Left ventricular peak systolic global longitudinal strain (GLS) is -16%.  Normal left ventricular diastolic function.  MV E/A ratio 1.36  Right ventricle is normal in size and contractility with a TAPSE of 2.1 cm.    Stress Cardiolite test 2/28/2025    Myocardial perfusion imaging indicates a normal myocardial perfusion study with no evidence of ischemia. Impressions are consistent with a low risk study.    Left ventricular ejection fraction is normal (Calculated EF = 63%).    This is normal Cardiolite imaging stress test with no evidence of ischemia or myocardial infarction.  Left ventricle size and function is normal on gated SPECT imaging.  No wall motion abnormality was noted.  Clinical  correlation recommended.  Further recommendation as per ordering physician. .    Findings consistent with a normal ECG stress test.  ==============  Plan  ========  Chest discomfort-atypical.  Stress Myoview-normal 2/28/2025  Echocardiogram 2/25/2025-as above    Dizziness  Rule out dysrhythmia.  30-day event monitor.    Bicuspid aortic valve and moderate aortic regurgitation.  Asymptomatic at this time.  Close follow-up    Follow-up in the office in 3 weeks.    Further plan will depend on patient's progress.  ]]]]]]]]]]]]]]]]]]    Katelynn Barros MD  02/28/25  08:21 EST

## 2025-02-28 NOTE — DISCHARGE SUMMARY
[facility]    Judithdeepa Sydnee TurpinOSMANY    CHIEF COMPLAINT:     Chest pain     HISTORY OF PRESENT ILLNESS:    Chest Pain   Associated symptoms include dizziness and near-syncope. Pertinent negatives include no diaphoresis, nausea, palpitations, shortness of breath or vomiting.     ED 2025  49-year-old male presents with a 4-day history of constant midsternal chest pressure with intermittent episodes of sharp pain. He reports that it is nonradiating. Patient reports that he has history of bicuspid valve regurgitation. He has not seen cardiology for 15 years. He reports that he was seen at [place] for the same and discharged. He had hospital follow-up with primary care today and was sent for further evaluation.     Observation 2025  Patient agrees with HPI noted above including sharp chest pain followed by constant significant chest pressure for the past 4 days. Associated with dizziness and near syncope. He reports that the dizziness has been going on for a while, approx once every 6 months. He feels better now.     Past Medical History:   Diagnosis Date    Parkinsons      Past Surgical History:   Procedure Laterality Date    KNEE SURGERY Right      History reviewed. No pertinent family history.  Social History     Tobacco Use    Smoking status: Former     Current packs/day: 0.00     Types: Cigarettes     Quit date: 2025     Years since quittin.0     Passive exposure: Current    Smokeless tobacco: Never    Tobacco comments:     Smokes 1 ppd x 30yrs   Vaping Use    Vaping status: Never Used   Substance Use Topics    Alcohol use: Yes     Comment: OCC    Drug use: Never     Medications Prior to Admission   Medication Sig Dispense Refill Last Dose/Taking    aspirin 81 MG EC tablet Take 1 tablet by mouth Daily.   2025    carbidopa-levodopa (SINEMET)  MG per tablet Take 1 tablet by mouth Every 3 (Three) Hours. While awake   2025    azithromycin (Zithromax  Z-Jean) 250 MG tablet Take1 tablet daily for 4 days. 4 tablet 0     benzonatate (TESSALON) 100 MG capsule Take 1 capsule by mouth 2 (Two) Times a Day As Needed for Cough. 20 capsule 0      Allergies:  Patient has no known allergies.    Immunization History   Administered Date(s) Administered    COVID-19 (PFIZER) Purple Cap Monovalent 03/18/2021, 04/09/2021           REVIEW OF SYSTEMS:    Review of Systems   Constitutional: Negative for diaphoresis.   Cardiovascular:  Positive for chest pain and near-syncope. Negative for palpitations.   Respiratory:  Negative for shortness of breath.    Gastrointestinal:  Negative for nausea and vomiting.   Neurological:  Positive for dizziness.   All other systems reviewed and are negative.        Vital Signs  Temp:  [97.6 °F (36.4 °C)-98.3 °F (36.8 °C)] 98.1 °F (36.7 °C)  Heart Rate:  [63-90] 90  Resp:  [11-23] 20  BP: ()/(52-89) 119/76          Physical Exam:  Physical Exam  Vitals and nursing note reviewed.   Constitutional:       Appearance: Normal appearance.   HENT:      Head: Normocephalic and atraumatic.      Right Ear: External ear normal.      Left Ear: External ear normal.      Nose: Nose normal.      Mouth/Throat:      Mouth: Mucous membranes are moist.   Eyes:      Extraocular Movements: Extraocular movements intact.   Cardiovascular:      Rate and Rhythm: Normal rate and regular rhythm.      Pulses: Normal pulses.      Heart sounds: Normal heart sounds.   Pulmonary:      Effort: Pulmonary effort is normal.      Breath sounds: Normal breath sounds.   Abdominal:      General: Abdomen is flat. Bowel sounds are normal.      Palpations: Abdomen is soft.   Musculoskeletal:         General: Normal range of motion.      Cervical back: Normal range of motion.   Skin:     General: Skin is warm.   Neurological:      Mental Status: He is alert and oriented to person, place, and time.   Psychiatric:         Behavior: Behavior normal.         Thought Content: Thought content  normal.         Judgment: Judgment normal.           Emotional Behavior:    Normal    Debilities:   NONE  Results Review:    I reviewed the patient's new clinical results.  Lab Results (most recent)       Procedure Component Value Units Date/Time    Basic Metabolic Panel [[id number]]  (Abnormal) Collected: 02/28/25 0301    Specimen: Blood Updated: 02/28/25 0337     Glucose 108 mg/dL      BUN 14 mg/dL      Creatinine 0.71 mg/dL      Sodium 141 mmol/L      Potassium 3.9 mmol/L      Chloride 107 mmol/L      CO2 24.2 mmol/L      Calcium 9.0 mg/dL      BUN/Creatinine Ratio 19.7     Anion Gap 9.8 mmol/L      eGFR 111.1 mL/min/1.73     Narrative:      GFR Categories in Chronic Kidney Disease (CKD)      GFR Category          GFR (mL/min/1.73)    Interpretation  G1                     90 or greater         Normal or high (1)  G2                      60-89                Mild decrease (1)  G3a                   45-59                Mild to moderate decrease  G3b                   30-44                Moderate to severe decrease  G4                    15-29                Severe decrease  G5                    14 or less           Kidney failure          (1)In the absence of evidence of kidney disease, neither GFR category G1 or G2 fulfill the criteria for CKD.    eGFR calculation 2021 CKD-EPI creatinine equation, which does not include race as a factor    CBC & Differential [[id number]]  (Abnormal) Collected: 02/28/25 0301    Specimen: Blood Updated: 02/28/25 0326    Narrative:      The following orders were created for panel order CBC & Differential.  Procedure                               Abnormality         Status                     ---------                               -----------         ------                     CBC Auto Differential[[id number]]        Abnormal            Final result                 Please view results for these tests on the individual orders.    CBC Auto Differential [[id number]]  (Abnormal)  Collected: 02/28/25 0301    Specimen: Blood Updated: 02/28/25 0326     WBC 9.19 10*3/mm3      RBC 4.43 10*6/mm3      Hemoglobin 14.1 g/dL      Hematocrit 41.9 %      MCV 94.6 fL      MCH 31.8 pg      MCHC 33.7 g/dL      RDW 12.5 %      RDW-SD 43.6 fl      MPV 10.8 fL      Platelets 289 10*3/mm3      Neutrophil % 50.2 %      Lymphocyte % 36.9 %      Monocyte % 7.8 %      Eosinophil % 3.6 %      Basophil % 1.2 %      Immature Grans % 0.3 %      Neutrophils, Absolute 4.61 10*3/mm3      Lymphocytes, Absolute 3.39 10*3/mm3      Monocytes, Absolute 0.72 10*3/mm3      Eosinophils, Absolute 0.33 10*3/mm3      Basophils, Absolute 0.11 10*3/mm3      Immature Grans, Absolute 0.03 10*3/mm3      nRBC 0.0 /100 WBC     High Sensitivity Troponin T 1Hr [[id number]]  (Normal) Collected: 02/27/25 1500    Specimen: Blood Updated: 02/27/25 1520     HS Troponin T 6 ng/L      Troponin T Numeric Delta -1 ng/L     Narrative:      High Sensitive Troponin T Reference Range:  <14.0 ng/L- Negative Female for AMI  <22.0 ng/L- Negative Male for AMI  >=14 - Abnormal Female indicating possible myocardial injury.  >=22 - Abnormal Male indicating possible myocardial injury.   Clinicians would have to utilize clinical acumen, EKG, Troponin, and serial changes to determine if it is an Acute Myocardial Infarction or myocardial injury due to an underlying chronic condition.         Comprehensive Metabolic Panel [[id number]] Collected: 02/27/25 1407    Specimen: Blood Updated: 02/27/25 1439     Glucose 86 mg/dL      BUN 10 mg/dL      Creatinine 0.78 mg/dL      Sodium 141 mmol/L      Potassium 4.6 mmol/L      Comment: Slight hemolysis detected by analyzer. Result may be falsely elevated.        Chloride 106 mmol/L      CO2 25.5 mmol/L      Calcium 9.3 mg/dL      Total Protein 7.3 g/dL      Albumin 4.3 g/dL      ALT (SGPT) 8 U/L      AST (SGOT) 33 U/L      Comment: Slight hemolysis detected by analyzer. Result may be falsely elevated.        Alkaline  Phosphatase 65 U/L      Total Bilirubin 0.6 mg/dL      Globulin 3.0 gm/dL      A/G Ratio 1.4 g/dL      BUN/Creatinine Ratio 12.8     Anion Gap 9.5 mmol/L      eGFR 108.0 mL/min/1.73     Narrative:      GFR Categories in Chronic Kidney Disease (CKD)      GFR Category          GFR (mL/min/1.73)    Interpretation  G1                     90 or greater         Normal or high (1)  G2                      60-89                Mild decrease (1)  G3a                   45-59                Mild to moderate decrease  G3b                   30-44                Moderate to severe decrease  G4                    15-29                Severe decrease  G5                    14 or less           Kidney failure          (1)In the absence of evidence of kidney disease, neither GFR category G1 or G2 fulfill the criteria for CKD.    eGFR calculation 2021 CKD-EPI creatinine equation, which does not include race as a factor    High Sensitivity Troponin T [[id number]]  (Normal) Collected: 02/27/25 1407    Specimen: Blood Updated: 02/27/25 1435     HS Troponin T 7 ng/L     Narrative:      High Sensitive Troponin T Reference Range:  <14.0 ng/L- Negative Female for AMI  <22.0 ng/L- Negative Male for AMI  >=14 - Abnormal Female indicating possible myocardial injury.  >=22 - Abnormal Male indicating possible myocardial injury.   Clinicians would have to utilize clinical acumen, EKG, Troponin, and serial changes to determine if it is an Acute Myocardial Infarction or myocardial injury due to an underlying chronic condition.         BNP [[id number]]  (Normal) Collected: 02/27/25 1407    Specimen: Blood Updated: 02/27/25 1435     proBNP <36.0 pg/mL     Narrative:      This assay is used as an aid in the diagnosis of individuals suspected of having heart failure. It can be used as an aid in the diagnosis of acute decompensated heart failure (ADHF) in patients presenting with signs and symptoms of ADHF to the emergency department (ED). In addition,  "NT-proBNP of <300 pg/mL indicates ADHF is not likely.    Age Range Result Interpretation  NT-proBNP Concentration (pg/mL:      <50             Positive            >450                   Gray                 300-450                    Negative             <300    50-75           Positive            >900                  Gray                300-900                  Negative            <300      >75             Positive            >1800                  Gray                300-1800                  Negative            <300    D-dimer, Quantitative [[id number]]  (Normal) Collected: 02/27/25 1407    Specimen: Blood Updated: 02/27/25 1428     D-Dimer, Quantitative <0.27 MCGFEU/mL     Narrative:      According to the assay 's published package insert, a normal (<0.50 MCGFEU/mL) D-dimer result in conjunction with a non-high clinical probability assessment, excludes deep vein thrombosis (DVT) and pulmonary embolism (PE) with high sensitivity.    D-dimer values increase with age and this can make VTE exclusion of an older population difficult. To address this, the [employer], based on best available evidence and recent guidelines, recommends that clinicians use age-adjusted D-dimer thresholds in patients greater than 50 years of age with: a) a low probability of PE who do not meet all Pulmonary Embolism Rule Out Criteria, or b) in those with intermediate probability of PE.   The formula for an age-adjusted D-dimer cut-off is \"age/100\".  For example, a 60 year old patient would have an age-adjusted cut-off of 0.60 MCGFEU/mL and an 80 year old 0.80 MCGFEU/mL.    [place] Draw [[id number]] Collected: 02/27/25 1407    Specimen: Blood Updated: 02/27/25 1415    Narrative:      The following orders were created for panel order [place] Draw.  Procedure                               Abnormality         Status                     ---------                               -----------         ------             "         Green Top (Gel)[[id number]]                                  Final result               Lavender Top[[id number]]                                     Final result               Gold Top - SST[[id number]]                                   Final result               Light Blue Top[[id number]]                                   Final result                 Please view results for these tests on the individual orders.    Green Top (Gel) [[id number]] Collected: 02/27/25 1407    Specimen: Blood Updated: 02/27/25 1415     Extra Tube Hold for add-ons.     Comment: Auto resulted.       Lavender Top [[id number]] Collected: 02/27/25 1407    Specimen: Blood Updated: 02/27/25 1415     Extra Tube hold for add-on     Comment: Auto resulted       Gold Top - SST [[id number]] Collected: 02/27/25 1407    Specimen: Blood Updated: 02/27/25 1415     Extra Tube Hold for add-ons.     Comment: Auto resulted.       Light Blue Top [[id number]] Collected: 02/27/25 1407    Specimen: Blood Updated: 02/27/25 1415     Extra Tube Hold for add-ons.     Comment: Auto resulted       CBC & Differential [[id number]]  (Normal) Collected: 02/27/25 1407    Specimen: Blood Updated: 02/27/25 1414    Narrative:      The following orders were created for panel order CBC & Differential.  Procedure                               Abnormality         Status                     ---------                               -----------         ------                     CBC Auto Differential[[id number]]        Normal              Final result                 Please view results for these tests on the individual orders.    CBC Auto Differential [[id number]]  (Normal) Collected: 02/27/25 1407    Specimen: Blood Updated: 02/27/25 1414     WBC 8.07 10*3/mm3      RBC 4.80 10*6/mm3      Hemoglobin 15.2 g/dL      Hematocrit 45.6 %      MCV 95.0 fL      MCH 31.7 pg      MCHC 33.3 g/dL      RDW 12.4 %      RDW-SD 43.2 fl      MPV 10.4 fL      Platelets 284 10*3/mm3      Neutrophil %  55.1 %      Lymphocyte % 33.8 %      Monocyte % 7.4 %      Eosinophil % 2.0 %      Basophil % 1.5 %      Immature Grans % 0.2 %      Neutrophils, Absolute 4.44 10*3/mm3      Lymphocytes, Absolute 2.73 10*3/mm3      Monocytes, Absolute 0.60 10*3/mm3      Eosinophils, Absolute 0.16 10*3/mm3      Basophils, Absolute 0.12 10*3/mm3      Immature Grans, Absolute 0.02 10*3/mm3      nRBC 0.0 /100 WBC             Imaging Results (Most Recent)       Procedure Component Value Units Date/Time    XR Chest 2 View [[id number]] Collected: 02/27/25 1457     Updated: 02/27/25 1500    Narrative:      XR CHEST 2 VW    Date of Exam: 2/27/2025 2:37 PM EST    Indication: Chest Pain Protocol  Chest Pain Protocol    Comparison: 7/11/2024    Findings:  The cardiomediastinal silhouette is within normal limits. Lungs are clear. No focal consolidation, pneumothorax, or significant pleural effusion. Osseous structures grossly intact.      Impression:      Impression:  No acute process.          Electronically Signed: Triston Anderson MD    2/27/2025 2:58 PM EST    Workstation ID: [id number]          reviewed    ECG/EMG Results (most recent)       Procedure Component Value Units Date/Time    ECG 12 Lead [[id number]] Resulted: 02/27/25 1609     Updated: 02/27/25 1609    ECG 12 Lead Chest Pain [[id number]] Collected: 02/27/25 1341     Updated: 02/28/25 0625     QT Interval 354 ms      QTC Interval 423 ms     Narrative:      HEART RATE=86  bpm  RR Kiyuepgy=029  ms  KS Nhzigkpi=051  ms  P Horizontal Axis=24  deg  P Front Axis=65  deg  QRSD Interval=81  ms  QT Fjrppezr=696  ms  DGsM=243  ms  QRS Axis=63  deg  T Wave Axis=44  deg  - NORMAL ECG -  Sinus rhythm  When compared with ECG of 11-Jul-2024 11:58:40,  No significant change  Electronically Signed By: Orlando Rico (MUNDO) 2025-02-28 06:25:07  Date and Time of Study:2025-02-27 13:41:15    Telemetry Scan [[id number]] Resulted: 02/27/25     Updated: 02/28/25 0903    Telemetry Scan [[id number]] Resulted:  02/27/25     Updated: 02/28/25 0924    Telemetry Scan [[id number]] Resulted: 02/27/25     Updated: 02/28/25 1006    Adult Transthoracic Echo Complete w/ Color, Spectral and Contrast if Necessary Per Protocol [[id number]] Resulted: 02/28/25 1204     Updated: 02/28/25 1204     EF_3D-VOL 63.0 %      LV GLOBAL STRAIN  -16.3 %      LVIDd 5.2 cm      LVIDs 3.5 cm      IVSd 1.00 cm      LVPWd 1.00 cm      FS 32.7 %      IVS/LVPW 1.00 cm      ESV(cubed) 42.9 ml      LV Sys Vol (BSA corrected) 19.6 cm2      EDV(cubed) 140.6 ml      LV Matson Vol (BSA corrected) 55.4 cm2      LV mass(C)d 194.2 grams      LVOT area 4.2 cm2      LVOT diam 2.30 cm      EDV(MOD-sp4) 105.0 ml      ESV(MOD-sp4) 37.2 ml      SV(MOD-sp4) 67.8 ml      SVi(MOD-SP4) 35.8 ml/m2      SVi (LVOT) 36.6 ml/m2      EF(MOD-sp4) 64.6 %      MV E max renaldo 56.2 cm/sec      MV A max renaldo 41.4 cm/sec      MV dec time 0.22 sec      MV E/A 1.36     MV A dur 0.12 sec      Med Peak E' Renaldo 9.0 cm/sec      Lat Peak E' Renaldo 10.7 cm/sec      Avg E/e' ratio 5.71     SV(LVOT) 69.4 ml      TAPSE (>1.6) 2.17 cm      RV S' 10.2 cm/sec      LA dimension (2D)  3.0 cm      LV V1 max 79.5 cm/sec      LV V1 max PG 2.5 mmHg      LV V1 mean PG 1.00 mmHg      LV V1 VTI 16.7 cm      Ao pk renaldo 102.0 cm/sec      Ao max PG 4.2 mmHg      Ao mean PG 2.00 mmHg      Ao V2 VTI 19.4 cm      DAT(I,D) 3.6 cm2      AI P1/2t 747.0 msec      MV max PG 2.23 mmHg      MV mean PG 1.00 mmHg      MV V2 VTI 24.0 cm      MV P1/2t 56.5 msec      MVA(P1/2t) 3.9 cm2      MVA(VTI) 2.9 cm2      MV dec slope 399.0 cm/sec2      RV V1 max PG 0.92 mmHg      RV V1 max 47.9 cm/sec      RV V1 VTI 12.2 cm      PA V2 max 91.3 cm/sec      PA acc time 0.15 sec      Sinus 4.0 cm      STJ 3.9 cm      Dimensionless Index 0.78 (DI)     Narrative:        Left ventricular ejection fraction appears to be 56 - 60%.    Indications  Chest pain    Technically satisfactory study.  Mitral valve is structurally normal.  Tricuspid valve is  structurally normal.  Aortic valve is thickened with adequate opening motion.  Moderate aortic   regurgitation is present.  Probable bicuspid aortic valve.  Pulmonic valve could not be well visualized.  No evidence for mitral tricuspid or aortic regurgitation is seen by   Doppler study.  Left atrium is normal in size.  Right atrium is normal in size.  Left ventricle is normal in size and contractility with ejection fraction   of 60%.  Left ventricular peak systolic global longitudinal strain (GLS) is -16%.  Normal left ventricular diastolic function.  MV E/A ratio 1.36  Right ventricle is normal in size and contractility with a TAPSE of 2.1   cm.  Atrial septum is intact.  Ascending aortic dilatation measuring 4.2 cm.  No pericardial effusion or intracardiac thrombus is seen.    Impression  Aortic valve is thickened with adequate opening motion.  Moderate aortic regurgitation is present.  Probable bicuspid aortic valve.  Left ventricular size and contractility is normal with ejection fraction   of 60%.  Left ventricular peak systolic global longitudinal strain (GLS) is -16%.  Normal left ventricular diastolic function.  MV E/A ratio 1.36  Right ventricle is normal in size and contractility with a TAPSE of 2.1   cm.      Telemetry Scan [[id number]] Resulted: 02/27/25     Updated: 02/28/25 1332          reviewed    Results for orders placed during the hospital encounter of 09/18/24    Duplex Venous Lower Extremity - Right CAR    Interpretation Summary    Normal right lower extremity venous duplex scan.      Results for orders placed during the hospital encounter of 02/27/25    Adult Transthoracic Echo Complete w/ Color, Spectral and Contrast if Necessary Per Protocol    Interpretation Summary    Left ventricular ejection fraction appears to be 56 - 60%.    Indications  Chest pain    Technically satisfactory study.  Mitral valve is structurally normal.  Tricuspid valve is structurally normal.  Aortic valve is thickened  with adequate opening motion.  Moderate aortic regurgitation is present.  Probable bicuspid aortic valve.  Pulmonic valve could not be well visualized.  No evidence for mitral tricuspid or aortic regurgitation is seen by Doppler study.  Left atrium is normal in size.  Right atrium is normal in size.  Left ventricle is normal in size and contractility with ejection fraction of 60%.  Left ventricular peak systolic global longitudinal strain (GLS) is -16%.  Normal left ventricular diastolic function.  MV E/A ratio 1.36  Right ventricle is normal in size and contractility with a TAPSE of 2.1 cm.  Atrial septum is intact.  Ascending aortic dilatation measuring 4.2 cm.  No pericardial effusion or intracardiac thrombus is seen.    Impression  Aortic valve is thickened with adequate opening motion.  Moderate aortic regurgitation is present.  Probable bicuspid aortic valve.  Left ventricular size and contractility is normal with ejection fraction of 60%.  Left ventricular peak systolic global longitudinal strain (GLS) is -16%.  Normal left ventricular diastolic function.  MV E/A ratio 1.36  Right ventricle is normal in size and contractility with a TAPSE of 2.1 cm.      Microbiology Results (last 10 days)       ** No results found for the last 240 hours. **            Assessment & Plan     Chest pressure        Chest pain and near syncope   Lab Results   Component Value Date    TROPONINT 6 02/27/2025    TROPONINT 7 02/27/2025   -Troponin proBNP unremarkable  -CMP and CBC generally unremarkable  -D-dimer negative  -EKG showed sinus rhythm with heart rate 86  -Chest x-ray showed no acute process  -Telemetry  -Cardiology consulted  -2D echo showed EF 56-60% and moderate aortic regurgitation  -Stress test showed normal myocardial perfusion no signs of ischemia  -Holter monitor at discharge  -Follow-up with cardiologist outpatient    Parkinson's  -Continue Sinemet    I discussed the patients findings and my recommendations with  patient and nursing staff.     Discharge Diagnosis:      Chest pressure      Hospital Course  Patient is a 49 y.o. male presented with chest pressure and near syncope as noted in HPI above.  Troponin, proBNP, CMP, CBC, D-dimer unremarkable.  EKG showed sinus rhythm and chest x-ray showed no acute process. Patient admitted in the observation unit with cardiology consultation. 2D echo showed EF 56-60% and stress test was negative for ischemia.  He was discharged with a Holter monitor and instructed to follow-up with cardiologist outpatient. At this time, patient felt to be in good condition for discharge with close follow up with PCP. Instructed to take all medications as prescribed and to return to ED if any concerning signs/symptoms. All test/lab results were discussed with patient. All questions were answered and patient verbalizes understanding.      Past Medical History:     Past Medical History:   Diagnosis Date    Parkinsons        Past Surgical History:     Past Surgical History:   Procedure Laterality Date    KNEE SURGERY Right        Social History:   Social History     Socioeconomic History    Marital status:    Tobacco Use    Smoking status: Former     Current packs/day: 0.00     Types: Cigarettes     Quit date: 2025     Years since quittin.0     Passive exposure: Current    Smokeless tobacco: Never    Tobacco comments:     Smokes 1 ppd x 30yrs   Vaping Use    Vaping status: Never Used   Substance and Sexual Activity    Alcohol use: Yes     Comment: OCC    Drug use: Never    Sexual activity: Defer       Procedures Performed         Consults:   Consults       Date and Time Order Name Status Description    2025  4:09 PM Inpatient Cardiology Consult              Condition on Discharge:     Stable    Discharge Disposition  Home or Self Care    Discharge Medications     Discharge Medications        Continue These Medications        Instructions Start Date   aspirin 81 MG EC tablet   81 mg,  Daily      azithromycin 250 MG tablet  Commonly known as: Zithromax Z-Jean   Take1 tablet daily for 4 days.      benzonatate 100 MG capsule  Commonly known as: TESSALON   100 mg, Oral, 2 Times Daily PRN      carbidopa-levodopa  MG per tablet  Commonly known as: SINEMET   1 tablet, Every 3 Hours               Discharge Diet:   Diet Instructions       Diet: Cardiac Diets; Healthy Heart (2-3 Na+); Regular (IDDSI 7); Thin (IDDSI 0)      Discharge Diet: Cardiac Diets    Cardiac Diet: Healthy Heart (2-3 Na+)    Texture: Regular (IDDSI 7)    Fluid Consistency: Thin (IDDSI 0)            Activity at Discharge:     Follow-up Appointments  No future appointments.  Additional Instructions for the Follow-ups that You Need to Schedule       Discharge Follow-up with PCP   As directed       Currently Documented PCP:    Sydnee Rodriges APRN    PCP Phone Number:    [phone]     Follow Up Details: 5-7 days        Discharge Follow-up with Specified Provider: Cardiologist; 2 Weeks   As directed      To: Cardiologist   Follow Up: 2 Weeks                Test Results Pending at Discharge  Pending Results       Procedure [Order ID] Specimen - Date/Time    Holter Monitor - 72 Hour Up To 15 Days [[id number]]     Holter Monitor - 72 Hour Up To 15 Days [[id number]]              Risk for Readmission (LACE) Score: 1 (2/28/2025  6:00 AM)      Greater than 30 minutes spent in discharge activities for this patient    Signature:Electronically signed by OSMANY Gastelum, 02/28/25, 2:59 PM EST.

## 2025-02-28 NOTE — PLAN OF CARE
Problem: Adult Inpatient Plan of Care  Goal: Plan of Care Review  Outcome: Progressing  Flowsheets (Taken 2/28/2025 0431)  Progress: no change  Outcome Evaluation: Patient is alert and oriented, patient uses call light to make needs known, patient is going for a stress test today patient is aware of what to expect.  Plan of Care Reviewed With: patient  Goal: Patient-Specific Goal (Individualized)  Outcome: Progressing  Goal: Absence of Hospital-Acquired Illness or Injury  Outcome: Progressing  Intervention: Identify and Manage Fall Risk  Recent Flowsheet Documentation  Taken 2/28/2025 0400 by Carey Holman RN  Safety Promotion/Fall Prevention: safety round/check completed  Taken 2/28/2025 0200 by Carey Holman RN  Safety Promotion/Fall Prevention: safety round/check completed  Taken 2/28/2025 0000 by Carey Holman RN  Safety Promotion/Fall Prevention: safety round/check completed  Intervention: Prevent Skin Injury  Recent Flowsheet Documentation  Taken 2/28/2025 0400 by Carey Holman RN  Body Position: position changed independently  Skin Protection: transparent dressing maintained  Taken 2/28/2025 0200 by Carey Holman RN  Body Position: position changed independently  Taken 2/28/2025 0000 by Carey Holman RN  Body Position: position changed independently  Skin Protection: transparent dressing maintained  Intervention: Prevent Infection  Recent Flowsheet Documentation  Taken 2/28/2025 0400 by Carey Holman RN  Infection Prevention: hand hygiene promoted  Taken 2/28/2025 0200 by Carey Holman RN  Infection Prevention: hand hygiene promoted  Taken 2/28/2025 0000 by Carey Holman RN  Infection Prevention:   hand hygiene promoted   single patient room provided  Goal: Optimal Comfort and Wellbeing  Outcome: Progressing  Intervention: Monitor Pain and Promote Comfort  Recent Flowsheet Documentation  Taken 2/28/2025 0400 by Carey Holman RN  Pain Management Interventions: pain management plan  reviewed with patient/caregiver  Taken 2/28/2025 0000 by Carey Holman RN  Pain Management Interventions: pain management plan reviewed with patient/caregiver  Intervention: Provide Person-Centered Care  Recent Flowsheet Documentation  Taken 2/28/2025 0400 by Carey Holman RN  Trust Relationship/Rapport:   care explained   choices provided   emotional support provided   empathic listening provided   questions answered   questions encouraged   reassurance provided   thoughts/feelings acknowledged  Taken 2/28/2025 0000 by Carey Holman RN  Trust Relationship/Rapport:   care explained   choices provided   emotional support provided   empathic listening provided   questions answered   questions encouraged   reassurance provided   thoughts/feelings acknowledged  Goal: Readiness for Transition of Care  Outcome: Progressing     Problem: Chest Pain  Goal: Resolution of Chest Pain Symptoms  Outcome: Progressing  Intervention: Manage Acute Chest Pain  Recent Flowsheet Documentation  Taken 2/28/2025 0400 by Carey Holman RN  Chest Pain Intervention: cardiac monitoring continued   Goal Outcome Evaluation:  Plan of Care Reviewed With: patient        Progress: no change  Outcome Evaluation: Patient is alert and oriented, patient uses call light to make needs known, patient is going for a stress test today patient is aware of what to expect.

## 2025-03-09 NOTE — PROGRESS NOTES
Encounter Date:03/17/2025    Last seen-2/28/2025 (in hospital)      Patient ID: Tray Henderson is a 49 y.o. male.    Chief Complaint:  Bicuspid aortic valve  Moderate aortic regurgitation  Chest discomfort  Dizziness  Near syncope    History of Present Illness  Patient recently was in the hospital with chest discomfort dizziness and near syncope.  Symptoms have improved.  Patient had an echocardiogram and stress Cardiolite test.  Patient was released home.      Since the time of discharge, the patient has been without any chest discomfort ,shortness of breath, palpitations, dizziness or syncope.  Denies having any headache ,abdominal pain ,nausea, vomiting , diarrhea constipation, loss of weight or loss of appetite.  Denies having any excessive bruising ,hematuria or blood in the stool.    Review of all systems negative except as indicated.    Reviewed ROS.    Assessment and Plan       ]]]]]]]]]]]]]]]]]]]]]]]  History  ============  -Chest discomfort-somewhat atypical.-Improved     - History of dizziness and possible near syncope.-Improved     - History of bicuspid aortic valve.  Moderate aortic regurgitation     - Status post knee surgery     - Former smoker     - No known allergies     Echocardiogram 2/28/2025  Aortic valve is thickened with adequate opening motion.  Moderate aortic regurgitation is present.  Probable bicuspid aortic valve.  Left ventricular size and contractility is normal with ejection fraction of 60%.  Left ventricular peak systolic global longitudinal strain (GLS) is -16%.  Normal left ventricular diastolic function.  MV E/A ratio 1.36  Right ventricle is normal in size and contractility with a TAPSE of 2.1 cm.     Stress Cardiolite test 2/28/2025    Myocardial perfusion imaging indicates a normal myocardial perfusion study with no evidence of ischemia. Impressions are consistent with a low risk study.    Left ventricular ejection fraction is normal (Calculated EF = 63%).    This is normal  Cardiolite imaging stress test with no evidence of ischemia or myocardial infarction.  Left ventricle size and function is normal on gated SPECT imaging.  No wall motion abnormality was noted.  Clinical correlation recommended.  Further recommendation as per ordering physician. .    Findings consistent with a normal ECG stress test.  ==============  Plan  ========  Chest discomfort-atypical.-Improved    Stress Myoview-normal 2/28/2025  Echocardiogram 2/25/2025-as above     Dizziness-improved  30-day event monitor just completed.  Results are not available at this time.    Bicuspid aortic valve and moderate aortic dilatation-asymptomatic.    Patient was educated regarding the results of the testing.    SBE prophylaxis.    Follow-up in the office in 1 year with an echocardiogram.    Further plan will depend on patient's progress.    Reviewed and updated 3/17/2025.    ]]]]]]]]]]]]]]]]]]     ]   Diagnosis Plan   1. Nonrheumatic aortic valve insufficiency  Adult Transthoracic Echo Complete W/ Cont if Necessary Per Protocol      2. Chest pressure  Adult Transthoracic Echo Complete W/ Cont if Necessary Per Protocol      3. Bicuspid aortic valve  Adult Transthoracic Echo Complete W/ Cont if Necessary Per Protocol      4. Chest discomfort  Adult Transthoracic Echo Complete W/ Cont if Necessary Per Protocol      LAB RESULTS (LAST 7 DAYS)    CBC        BMP        CMP         BNP        TROPONIN        CoAg        Creatinine Clearance  Estimated Creatinine Clearance: 130.2 mL/min (A) (by C-G formula based on SCr of 0.71 mg/dL (L)).    ABG        Radiology  No radiology results for the last day                The following portions of the patient's history were reviewed and updated as appropriate: allergies, current medications, past family history, past medical history, past social history, past surgical history, and problem list.    Review of Systems   Constitutional: Negative for malaise/fatigue.   Cardiovascular:  Negative  "for chest pain, leg swelling, palpitations and syncope.   Respiratory:  Negative for shortness of breath.    Skin:  Negative for rash.   Gastrointestinal:  Negative for nausea and vomiting.   Neurological:  Negative for dizziness, light-headedness and numbness.   All other systems reviewed and are negative.        Current Outpatient Medications:     aspirin 81 MG EC tablet, Take 1 tablet by mouth Daily., Disp: , Rfl:     carbidopa-levodopa (SINEMET)  MG per tablet, Take 1 tablet by mouth Every 3 (Three) Hours. While awake, Disp: , Rfl:     No Known Allergies    Family History   Problem Relation Age of Onset    Stroke Father     Heart disease Maternal Grandfather        Past Surgical History:   Procedure Laterality Date    KNEE SURGERY Right        Past Medical History:   Diagnosis Date    Parkinsons        Family History   Problem Relation Age of Onset    Stroke Father     Heart disease Maternal Grandfather        Social History     Socioeconomic History    Marital status:    Tobacco Use    Smoking status: Former     Current packs/day: 0.00     Types: Cigarettes     Quit date: 2025     Years since quittin.0     Passive exposure: Past    Smokeless tobacco: Never    Tobacco comments:     Smokes 1 ppd x 30yrs   Vaping Use    Vaping status: Never Used   Substance and Sexual Activity    Alcohol use: Yes     Comment: OCC    Drug use: Never    Sexual activity: Defer         Procedures      Objective:       Physical Exam    /69 (BP Location: Right arm, Patient Position: Sitting, Cuff Size: Adult)   Pulse 70   Ht 175.3 cm (69\")   Wt 74.8 kg (165 lb)   SpO2 98%   BMI 24.37 kg/m²   The patient is alert, oriented and in no distress.    Vital signs as noted above.    Vital signs as noted above.     Head and neck revealed no carotid bruits or jugular venous distension.  No thyromegaly or lymphadenopathy is present.     Lungs clear.  No wheezing.  Breath sounds are normal bilaterally.     Heart " normal first and second heart sounds.  Grade 2/6 diastolic murmur...  No pericardial rub is present.  No gallop is present.     Abdomen soft and nontender.  No organomegaly is present.     Extremities revealed good peripheral pulses without any pedal edema.     Skin warm and dry.     Musculoskeletal system is grossly normal.     CNS grossly normal.    Reviewed and updated.

## 2025-03-17 ENCOUNTER — OFFICE VISIT (OUTPATIENT)
Dept: CARDIOLOGY | Facility: CLINIC | Age: 50
End: 2025-03-17
Payer: COMMERCIAL

## 2025-03-17 VITALS
SYSTOLIC BLOOD PRESSURE: 104 MMHG | WEIGHT: 165 LBS | HEART RATE: 70 BPM | HEIGHT: 69 IN | OXYGEN SATURATION: 98 % | DIASTOLIC BLOOD PRESSURE: 69 MMHG | BODY MASS INDEX: 24.44 KG/M2

## 2025-03-17 DIAGNOSIS — Q23.81 BICUSPID AORTIC VALVE: ICD-10-CM

## 2025-03-17 DIAGNOSIS — R07.89 CHEST DISCOMFORT: ICD-10-CM

## 2025-03-17 DIAGNOSIS — I35.1 NONRHEUMATIC AORTIC VALVE INSUFFICIENCY: Primary | ICD-10-CM

## 2025-03-17 DIAGNOSIS — R07.89 CHEST PRESSURE: ICD-10-CM

## 2025-03-24 LAB
CV ZIO BASELINE AVG BPM: 87 BPM
CV ZIO BASELINE BPM HIGH: 167 BPM
CV ZIO BASELINE BPM LOW: 49 BPM
CV ZIO DEVICE ANALYSIS TIME: NORMAL
CV ZIO ECT SVE COUNT: 152 EPISODES
CV ZIO ECT SVE CPLT COUNT: 6 EPISODES
CV ZIO ECT SVE CPLT FREQ: NORMAL
CV ZIO ECT SVE FREQ: NORMAL
CV ZIO ECT SVE TPLT COUNT: 5 EPISODES
CV ZIO ECT SVE TPLT FREQ: NORMAL
CV ZIO ECT VE COUNT: 74 EPISODES
CV ZIO ECT VE CPLT COUNT: 7 EPISODES
CV ZIO ECT VE CPLT FREQ: NORMAL
CV ZIO ECT VE FREQ: NORMAL
CV ZIO ECT VE TPLT COUNT: 0 EPISODES
CV ZIO ECT VE TPLT FREQ: 0
CV ZIO ECTOPIC SVE COUPLET RAW PERCENT: 0 %
CV ZIO ECTOPIC SVE ISOLATED PERCENT: 0.01 %
CV ZIO ECTOPIC SVE TRIPLET RAW PERCENT: 0 %
CV ZIO ECTOPIC VE COUPLET RAW PERCENT: 0 %
CV ZIO ECTOPIC VE ISOLATED PERCENT: 0 %
CV ZIO ECTOPIC VE TRIPLET RAW PERCENT: 0 %
CV ZIO ENROLLMENT END: NORMAL
CV ZIO ENROLLMENT START: NORMAL
CV ZIO PATIENT EVENTS DIARIES: 0
CV ZIO PATIENT EVENTS TRIGGERS: 8
CV ZIO PAUSE COUNT: 0
CV ZIO PRESCRIPTION STATUS: NORMAL
CV ZIO SVT AVG BPM: 137 BPM
CV ZIO SVT BPM HIGH: 167 BPM
CV ZIO SVT BPM LOW: 103 BPM
CV ZIO SVT COUNT: 2
CV ZIO SVT F EPI AVG BPM: 163 BPM
CV ZIO SVT F EPI BEATS: 5 BEATS
CV ZIO SVT F EPI BPM HIGH: 167 BPM
CV ZIO SVT F EPI BPM LOW: 156 BPM
CV ZIO SVT F EPI DUR: 1.7 SEC
CV ZIO SVT F EPI END: NORMAL
CV ZIO SVT F EPI START: NORMAL
CV ZIO SVT L EPI AVG BPM: 112 BPM
CV ZIO SVT L EPI BEATS: 4 BEATS
CV ZIO SVT L EPI BPM HIGH: 124 BPM
CV ZIO SVT L EPI BPM LOW: 103 BPM
CV ZIO SVT L EPI DUR: 2.2 SEC
CV ZIO SVT L EPI END: NORMAL
CV ZIO SVT L EPI START: NORMAL
CV ZIO SVT SYMPT IN PT: NORMAL
CV ZIO TOTAL  ENROLLMENT PERIOD: NORMAL
CV ZIO VT COUNT: 0